# Patient Record
Sex: FEMALE | Race: WHITE
[De-identification: names, ages, dates, MRNs, and addresses within clinical notes are randomized per-mention and may not be internally consistent; named-entity substitution may affect disease eponyms.]

---

## 2019-01-25 ENCOUNTER — HOSPITAL ENCOUNTER (EMERGENCY)
Dept: HOSPITAL 62 - ER | Age: 54
Discharge: HOME | End: 2019-01-25
Payer: COMMERCIAL

## 2019-01-25 VITALS — DIASTOLIC BLOOD PRESSURE: 60 MMHG | SYSTOLIC BLOOD PRESSURE: 111 MMHG

## 2019-01-25 DIAGNOSIS — R53.82: ICD-10-CM

## 2019-01-25 DIAGNOSIS — G89.29: ICD-10-CM

## 2019-01-25 DIAGNOSIS — R29.898: ICD-10-CM

## 2019-01-25 DIAGNOSIS — R10.9: ICD-10-CM

## 2019-01-25 DIAGNOSIS — R53.81: ICD-10-CM

## 2019-01-25 DIAGNOSIS — J02.9: Primary | ICD-10-CM

## 2019-01-25 DIAGNOSIS — R06.02: ICD-10-CM

## 2019-01-25 LAB
A TYPE INFLUENZA AG: NEGATIVE
ADD MANUAL DIFF: NO
ALBUMIN SERPL-MCNC: 4.8 G/DL (ref 3.5–5)
ALP SERPL-CCNC: 79 U/L (ref 38–126)
ALT SERPL-CCNC: 27 U/L (ref 9–52)
ANION GAP SERPL CALC-SCNC: 9 MMOL/L (ref 5–19)
APPEARANCE UR: CLEAR
APTT PPP: YELLOW S
AST SERPL-CCNC: 21 U/L (ref 14–36)
B INFLUENZA AG: NEGATIVE
BASOPHILS # BLD AUTO: 0.1 10^3/UL (ref 0–0.2)
BASOPHILS NFR BLD AUTO: 1.1 % (ref 0–2)
BILIRUB DIRECT SERPL-MCNC: 0.2 MG/DL (ref 0–0.4)
BILIRUB SERPL-MCNC: 0.4 MG/DL (ref 0.2–1.3)
BILIRUB UR QL STRIP: NEGATIVE
BUN SERPL-MCNC: 14 MG/DL (ref 7–20)
CALCIUM: 9.7 MG/DL (ref 8.4–10.2)
CHLORIDE SERPL-SCNC: 101 MMOL/L (ref 98–107)
CO2 SERPL-SCNC: 29 MMOL/L (ref 22–30)
EOSINOPHIL # BLD AUTO: 0.1 10^3/UL (ref 0–0.6)
EOSINOPHIL NFR BLD AUTO: 1.9 % (ref 0–6)
ERYTHROCYTE [DISTWIDTH] IN BLOOD BY AUTOMATED COUNT: 15.5 % (ref 11.5–14)
FREE T4 (FREE THYROXINE): 1.18 NG/DL (ref 0.78–2.19)
GLUCOSE SERPL-MCNC: 93 MG/DL (ref 75–110)
GLUCOSE UR STRIP-MCNC: NEGATIVE MG/DL
HCT VFR BLD CALC: 37.9 % (ref 36–47)
HGB BLD-MCNC: 12.3 G/DL (ref 12–15.5)
KETONES UR STRIP-MCNC: NEGATIVE MG/DL
LYMPHOCYTES # BLD AUTO: 1.7 10^3/UL (ref 0.5–4.7)
LYMPHOCYTES NFR BLD AUTO: 26.3 % (ref 13–45)
MCH RBC QN AUTO: 25.8 PG (ref 27–33.4)
MCHC RBC AUTO-ENTMCNC: 32.5 G/DL (ref 32–36)
MCV RBC AUTO: 79 FL (ref 80–97)
MONOCYTES # BLD AUTO: 0.4 10^3/UL (ref 0.1–1.4)
MONOCYTES NFR BLD AUTO: 6.1 % (ref 3–13)
NEUTROPHILS # BLD AUTO: 4.3 10^3/UL (ref 1.7–8.2)
NEUTS SEG NFR BLD AUTO: 64.6 % (ref 42–78)
NITRITE UR QL STRIP: NEGATIVE
PH UR STRIP: 5 [PH] (ref 5–9)
PLATELET # BLD: 337 10^3/UL (ref 150–450)
POTASSIUM SERPL-SCNC: 4.1 MMOL/L (ref 3.6–5)
PROT SERPL-MCNC: 7.8 G/DL (ref 6.3–8.2)
PROT UR STRIP-MCNC: NEGATIVE MG/DL
RBC # BLD AUTO: 4.78 10^6/UL (ref 3.72–5.28)
SODIUM SERPL-SCNC: 138.7 MMOL/L (ref 137–145)
SP GR UR STRIP: 1.01
T3FREE SERPL-MCNC: 3.27 PG/ML (ref 2.77–5.27)
TOTAL CELLS COUNTED % (AUTO): 100 %
TSH SERPL-ACNC: 2.54 UIU/ML (ref 0.47–4.68)
UROBILINOGEN UR-MCNC: NEGATIVE MG/DL (ref ?–2)
WBC # BLD AUTO: 6.6 10^3/UL (ref 4–10.5)

## 2019-01-25 PROCEDURE — 71046 X-RAY EXAM CHEST 2 VIEWS: CPT

## 2019-01-25 PROCEDURE — 84439 ASSAY OF FREE THYROXINE: CPT

## 2019-01-25 PROCEDURE — 80053 COMPREHEN METABOLIC PANEL: CPT

## 2019-01-25 PROCEDURE — 36415 COLL VENOUS BLD VENIPUNCTURE: CPT

## 2019-01-25 PROCEDURE — 84481 FREE ASSAY (FT-3): CPT

## 2019-01-25 PROCEDURE — 81001 URINALYSIS AUTO W/SCOPE: CPT

## 2019-01-25 PROCEDURE — 85025 COMPLETE CBC W/AUTO DIFF WBC: CPT

## 2019-01-25 PROCEDURE — 87804 INFLUENZA ASSAY W/OPTIC: CPT

## 2019-01-25 PROCEDURE — 84484 ASSAY OF TROPONIN QUANT: CPT

## 2019-01-25 PROCEDURE — 99284 EMERGENCY DEPT VISIT MOD MDM: CPT

## 2019-01-25 PROCEDURE — 84443 ASSAY THYROID STIM HORMONE: CPT

## 2019-01-25 NOTE — RADIOLOGY REPORT (SQ)
EXAM DESCRIPTION:  CHEST 2 VIEWS



COMPLETED DATE/TIME:  1/25/2019 6:21 pm



REASON FOR STUDY:  SOB



COMPARISON:  10/13/2016



EXAM PARAMETERS:  NUMBER OF VIEWS: two views

TECHNIQUE: Digital Frontal and Lateral radiographic views of the chest acquired.

RADIATION DOSE: NA

LIMITATIONS: none



FINDINGS:  LUNGS AND PLEURA: No opacities, masses or pneumothorax. No pleural effusion.

MEDIASTINUM AND HILAR STRUCTURES: No masses or contour abnormalities.

HEART AND VASCULAR STRUCTURES: Heart normal size.  No evidence for failure.

BONES: No acute findings.

HARDWARE: None in the chest.

OTHER: No other significant finding.



IMPRESSION:  NO ACUTE RADIOGRAPHIC FINDING IN THE CHEST.



TECHNICAL DOCUMENTATION:  JOB ID:  [id number]

 2011 [employer]- All Rights Reserved



Reading location - IP/workstation name: [email or web address]

## 2019-01-25 NOTE — ER DOCUMENT REPORT
Entered by BRETT MCDUFFIE SCRIBE  19 1804 





Acting as scribe for:DARLENE COHEN DO





ED General





- General


Chief Complaint: Abdominal Pain


Stated Complaint: ABDOMINAL PAIN/SORE THROAT


Time Seen by Provider: 19 17:25


Notes: 





53-year-old female who presents to the emergency department today for complaints

of abdominal pain, shortness of breath, throat pain, generalized fatigue, "brain

fog" and generalized body aches for x4-5 months.  Patient states she saw a 

provider at [facility] approximately 2 months ago for the same symptoms, 

blood work was performed, but she never went back for the results.  Patient 

states that she "just wants to get checked out".


TRAVEL OUTSIDE OF THE [place] IN LAST 30 DAYS: No





- Related Data


Allergies/Adverse Reactions: 


                                        





No Known Allergies Allergy (Verified 19 14:51)


   











Past Medical History





- General


Information source: Patient





- Social History


Smoking Status: Never Smoker


Cigarette use (# per day): No


Chew tobacco use (# tins/day): No


Frequency of alcohol use: None


Drug Abuse: None


Family History: Reviewed & Not Pertinent


Patient has suicidal ideation: No


Patient has homicidal ideation: No


Renal/ Medical History: Denies: Hx Peritoneal Dialysis


Past Surgical History: Reports: Hx  Section





Review of Systems





- Review of Systems


Constitutional: See HPI, Malaise


EENT: See HPI, Throat pain


Cardiovascular: No symptoms reported


Respiratory: See HPI, Short of breath


Gastrointestinal: See HPI, Abdominal pain


Genitourinary: No symptoms reported


Female Genitourinary: No symptoms reported


Musculoskeletal: See HPI, Muscle stiffness


Skin: No symptoms reported


Hematologic/Lymphatic: No symptoms reported


Neurological/Psychological: No symptoms reported


-: Yes All other systems reviewed and negative





Physical Exam





- Vital signs


Vitals: 


                                        











Temp Pulse Resp BP Pulse Ox


 


 98.1 F   58 L  16   110/64   97 


 


 19 15:17  19 15:17  19 15:17  19 15:17  19 15:17














- Notes


Notes: 





PHYSICAL EXAM


 


GENERAL: Alert, interacts well. No acute distress.


HEAD: Normocephalic, atraumatic.


EYES: Pupils equal, round, and reactive to light. Extraocular movements intact.


ENT: Oral mucosa moist, tongue midline. 


NECK: Full range of motion. Supple. Trachea midline.


LUNGS: Clear to auscultation bilaterally, no wheezes, rales, or rhonchi. No 

respiratory distress.


HEART: Regular rate and rhythm. No murmurs, gallops, or rubs.


ABDOMEN: Soft, non-tender. Non-distended. Bowel sounds present in all 4 

quadrants. No guarding, rigidity, or rebound.


EXTREMITIES: Moves all 4 extremities spontaneously. 


NEUROLOGICAL: Alert and oriented x3. Normal speech. Cranial nerves II through 

XII grossly intact.  Finger to nose and heel to shin test intact.  Deep tendon 

reflexes 2+ in the biceps and patellar tendons.


PSYCH: Normal affect, normal mood.


SKIN: Warm, dry, normal turgor. No rashes or lesions noted.








Course





- Re-evaluation


Re-evalutation: 





19 20:05


CBC unremarkable, CMP unremarkable, cardiac enzymes negative, thyroid function 

studies normal, urinalysis unremarkable, flu swabs negative, chest x-ray shows 

no acute process.  Patient is well-appearing, no source found for her multiple 

chronic complaints.  Patient will be discharged and asked to follow-up with her 

primary care physician as an outpatient.





- Vital Signs


Vital signs: 


                                        











Temp Pulse Resp BP Pulse Ox


 


 98.1 F   58 L  16   110/64   97 


 


 19 15:17  19 15:17  19 15:17  19 15:17  19 15:17














- Laboratory


Result Diagrams: 


                                 19 18:02





                                 19 18:02


Laboratory results interpreted by me: 


                                        











  19





  18:02


 


MCV  79 L


 


MCH  25.8 L


 


RDW  15.5 H














Discharge





- Discharge


Clinical Impression: 


 Sore throat, Chronic abdominal pain





Fatigue


Qualifiers:


 Fatigue type: chronic, unspecified Qualified Code(s): R53.82 - Chronic fatigue,

unspecified





Condition: Stable


Disposition: HOME, SELF-CARE


Additional Instructions: 


Today your blood work was all normal.  I did not find an explanation for your s

ymptoms.  Your thyroid function was normal.  Your chest x-ray was normal.





It is very important that you follow-up with your primary care physician for 

further investigation into what is causing your chronic abdominal pain, 

shortness of breath, sore throat and fatigue.


Scribe Attestation: 





19 20:14


I personally performed the services described in the documentation, reviewed and

edited the documentation which was dictated to the scribe in my presence, and it

accurately records my words and actions.





I personally performed the services described in the documentation, reviewed and

edited the documentation which was dictated to the scribe in my presence, and it

accurately records my words and actions.

## 2022-11-21 ENCOUNTER — HOSPITAL ENCOUNTER (OUTPATIENT)
Dept: RADIOLOGY | Facility: HOSPITAL | Age: 57
Discharge: HOME | End: 2022-11-21
Attending: ORTHOPAEDIC SURGERY

## 2022-11-21 ENCOUNTER — OFFICE VISIT (OUTPATIENT)
Dept: OBGYN CLINIC | Facility: HOSPITAL | Age: 57
End: 2022-11-21

## 2022-11-21 VITALS
BODY MASS INDEX: 31.69 KG/M2 | HEART RATE: 71 BPM | HEIGHT: 64 IN | WEIGHT: 185.6 LBS | SYSTOLIC BLOOD PRESSURE: 102 MMHG | DIASTOLIC BLOOD PRESSURE: 70 MMHG

## 2022-11-21 DIAGNOSIS — S83.242A OTHER TEAR OF MEDIAL MENISCUS, CURRENT INJURY, LEFT KNEE, INITIAL ENCOUNTER: ICD-10-CM

## 2022-11-21 DIAGNOSIS — S83.242A ACUTE MEDIAL MENISCUS TEAR OF LEFT KNEE, INITIAL ENCOUNTER: Primary | ICD-10-CM

## 2022-11-21 DIAGNOSIS — M25.572 PAIN, JOINT, ANKLE AND FOOT, LEFT: ICD-10-CM

## 2022-11-21 DIAGNOSIS — M25.561 RIGHT KNEE PAIN, UNSPECIFIED CHRONICITY: ICD-10-CM

## 2022-11-21 DIAGNOSIS — M25.562 LEFT KNEE PAIN, UNSPECIFIED CHRONICITY: ICD-10-CM

## 2022-11-21 NOTE — ASSESSMENT & PLAN NOTE
Because of the chronicity of her imaging we are going to order a new MRI of her knee  We will see how that turns out direct her care from there

## 2022-11-21 NOTE — PROGRESS NOTES
Assessment/Plan:    No problem-specific Assessment & Plan notes found for this encounter  Diagnoses and all orders for this visit:    Acute medial meniscus tear of left knee, initial encounter  -     XR knee 3 vw left non injury; Future  -     MRI knee left  wo contrast; Future    Right knee pain, unspecified chronicity  -     XR knee 3 vw right non injury; Future    Pain, joint, ankle and foot, left  -     XR ankle 3+ vw left; Future    Other tear of medial meniscus, current injury, left knee, initial encounter          Subjective:      Patient ID: Angeline Corona is a 62 y o  female  This is a 61-year-old woman who comes in with multiple complaints including both knees and both ankles  She has been worked up over a year and a half ago was told that she had a left medial meniscal tear  She also had x-rays of her ankles which were negative  She now follows up  She did not have any treatment for her meniscal tear at the time  The following portions of the patient's history were reviewed and updated as appropriate: allergies, current medications, past family history, past medical history, past social history, past surgical history, and problem list     Review of Systems      Objective:      /70   Pulse 71   Ht 5' 4" (1 626 m)   Wt 84 2 kg (185 lb 9 6 oz)   BMI 31 86 kg/m²          Physical Exam      On physical examination she is moderately swollen in the left knee with a moderate effusion  Her alignment looks good  She has near full range of motion  She has some tenderness over the medial and lateral joint lines  Her knee is stable to exam       On her ankle she does have moderate swelling of both ankles  It appears that she probably has an effusion there as well  Her alignment looks good there is no sign of any issues

## 2023-01-12 ENCOUNTER — TELEPHONE (OUTPATIENT)
Dept: OBGYN CLINIC | Facility: HOSPITAL | Age: 58
End: 2023-01-12

## 2023-01-12 NOTE — TELEPHONE ENCOUNTER
Called left message  Patient canceled the MRI  Appointment on 1/16 with Case Jiménez to go over results  Will patient reschedule MRI or should the appt with linda be rescheduled

## 2023-02-10 ENCOUNTER — OFFICE VISIT (OUTPATIENT)
Dept: FAMILY MEDICINE CLINIC | Facility: CLINIC | Age: 58
End: 2023-02-10

## 2023-02-10 VITALS
HEART RATE: 61 BPM | DIASTOLIC BLOOD PRESSURE: 67 MMHG | RESPIRATION RATE: 18 BRPM | TEMPERATURE: 97.6 F | WEIGHT: 188 LBS | OXYGEN SATURATION: 97 % | SYSTOLIC BLOOD PRESSURE: 102 MMHG | BODY MASS INDEX: 33.31 KG/M2 | HEIGHT: 63 IN

## 2023-02-10 DIAGNOSIS — K21.9 GASTROESOPHAGEAL REFLUX DISEASE, UNSPECIFIED WHETHER ESOPHAGITIS PRESENT: ICD-10-CM

## 2023-02-10 DIAGNOSIS — Z13.6 SCREENING FOR CARDIOVASCULAR CONDITION: ICD-10-CM

## 2023-02-10 DIAGNOSIS — N95.0 POSTMENOPAUSAL VAGINAL BLEEDING: Primary | ICD-10-CM

## 2023-02-10 RX ORDER — CALCIUM CARBONATE 200(500)MG
1 TABLET,CHEWABLE ORAL DAILY
COMMUNITY

## 2023-02-10 RX ORDER — PANTOPRAZOLE SODIUM 20 MG/1
20 TABLET, DELAYED RELEASE ORAL DAILY
Qty: 60 TABLET | Refills: 0 | Status: SHIPPED | OUTPATIENT
Start: 2023-02-10

## 2023-02-10 NOTE — ASSESSMENT & PLAN NOTE
Postmenopausal for past 3-4 years  Woke up 5 days ago with vaginal bleeding  Decreased bleeding now  Described as going through her "normal" period  Mild tenderness on L lower abdomen     · FSH, LH  · CBC  · US abdominal and transvaginal   · If endometrium thicker than 4mm, refer to GYN for endometrial biopsy

## 2023-02-10 NOTE — ASSESSMENT & PLAN NOTE
Progressively worsening  reflux symptoms soon after patient eats  Led to nausea and vomiting  Not associated with specific foods  Patient stated food sometimes feels "stuck" in throat; has had events of vomiting undigested foods     · Pantoprazole 20mg, advised to take it 30 mins before meals  · F/U in 2 weeks with plan - if feeling of food stuck in throat continues with vomiting of undigested food, refer to further work up to r/o esophageal obstruction/malignancy

## 2023-02-10 NOTE — PROGRESS NOTES
Assessment/Plan:    Postmenopausal vaginal bleeding  Postmenopausal for past 3-4 years  Woke up 5 days ago with vaginal bleeding  Decreased bleeding now  Described as going through her "normal" period  Mild tenderness on L lower abdomen  [address], [address]  CBC  US abdominal and transvaginal   If endometrium thicker than 4mm, refer to GYN for endometrial biopsy    Gastroesophageal reflux disease  Progressively worsening  reflux symptoms soon after patient eats  Led to nausea and vomiting  Not associated with specific foods  Patient stated food sometimes feels "stuck" in throat; has had events of vomiting undigested foods  Pantoprazole 20mg, advised to take it 30 mins before meals  F/U in 2 weeks with plan - if feeling of food stuck in throat continues with vomiting of undigested food, refer to further work up to r/o esophageal obstruction/malignancy    Screening for cardiovascular condition  Heme work up for annual physical   Lipid panel  CMP       Diagnoses and all orders for this visit:    Postmenopausal vaginal bleeding  -     FSH and LH; Future  -     US abdomen and pelvis with transvaginal; Future    Gastroesophageal reflux disease, unspecified whether esophagitis present  -     pantoprazole (PROTONIX) 20 mg tablet; Take 1 tablet (20 mg total) by mouth daily    Screening for cardiovascular condition  -     Lipid panel; Future  -     CBC and Platelet; Future  -     Comprehensive metabolic panel; Future    Other orders  -     calcium carbonate (TUMS) 500 mg chewable tablet; Chew 1 tablet daily        Subjective:      Patient ID: Chantelle Bowers is a 62 y o  female  HPI   63 yo female with multiple complaints  Recently moved to [place] from [address] [place] [address] and lives with son  PMH of last menstrual bleeding 3-4 years ago, C section 30 years ago with b/l tubal ligation and no use of any contraceptives presents with vaginal bleeding   Patient woke up with vaginal bleeding; now at 5 days and is decreasing - states it feels like the same pattern as when she would menstruate  Currently wearing a pad  No fatigue symptoms  Patient with persistent nausea and vomiting with reflux symptoms ongoing for past few months  No association with any specific foods  Also stated that recently she has felt that food gets "stuck" in her throat which she vomits out; digested and undigested  Self treated at home with calcium carbonate which provides temporary relief  The following portions of the patient's history were reviewed and updated as appropriate: allergies, current medications, past family history, past medical history, past social history, past surgical history and problem list     Review of Systems   Constitutional: Negative for chills and fever  HENT: Negative for ear pain and sore throat  Eyes: Negative for pain and visual disturbance  Respiratory: Negative for cough and shortness of breath  Cardiovascular: Negative for chest pain and palpitations  Gastrointestinal: Positive for nausea and vomiting  Negative for abdominal pain, blood in stool, constipation and diarrhea  Genitourinary: Positive for menstrual problem, vaginal bleeding and vaginal pain  Negative for dysuria and hematuria  Musculoskeletal: Negative for arthralgias and back pain  Skin: Negative for color change and rash  Neurological: Negative for seizures and syncope  All other systems reviewed and are negative  Objective:      /67   Pulse 61   Temp 97 6 °F (36 4 °C)   Resp 18   Ht 5' 2 5" (1 588 m)   Wt 85 3 kg (188 lb)   SpO2 97%   BMI 33 84 kg/m²          Physical Exam  Vitals reviewed  Constitutional:       General: She is awake  She is not in acute distress  Appearance: Normal appearance  She is not ill-appearing  HENT:      Head: Normocephalic and atraumatic        Right Ear: External ear normal       Left Ear: External ear normal       Nose: Nose normal       Mouth/Throat:      Mouth: Mucous membranes are moist       Tongue: Tongue does not deviate from midline  Pharynx: Oropharynx is clear  Eyes:      Extraocular Movements: Extraocular movements intact  Cardiovascular:      Rate and Rhythm: Normal rate and regular rhythm  Pulses: Normal pulses  Heart sounds: Normal heart sounds  No murmur heard  Pulmonary:      Effort: Pulmonary effort is normal  No respiratory distress  Breath sounds: Normal breath sounds and air entry  No wheezing or rales  Abdominal:      General: Bowel sounds are normal  There is no distension  Palpations: Abdomen is soft  There is no mass  Tenderness: There is abdominal tenderness (L lower on mild palpation)  There is no guarding or rebound  Musculoskeletal:         General: No swelling, deformity or signs of injury  Normal range of motion  Cervical back: Normal range of motion and neck supple  Skin:     General: Skin is warm and dry  Capillary Refill: Capillary refill takes less than 2 seconds  Findings: No bruising or rash  Neurological:      General: No focal deficit present  Mental Status: She is alert and oriented to person, place, and time  Mental status is at baseline  Psychiatric:         Mood and Affect: Mood normal          Behavior: Behavior normal  Behavior is cooperative  Thought Content:  Thought content normal

## 2023-02-17 ENCOUNTER — APPOINTMENT (OUTPATIENT)
Dept: LAB | Age: 58
End: 2023-02-17

## 2023-02-17 DIAGNOSIS — Z13.6 SCREENING FOR CARDIOVASCULAR CONDITION: Primary | ICD-10-CM

## 2023-02-17 DIAGNOSIS — N95.0 POSTMENOPAUSAL VAGINAL BLEEDING: ICD-10-CM

## 2023-02-17 LAB
ALBUMIN SERPL BCP-MCNC: 3.5 G/DL (ref 3.5–5)
ALP SERPL-CCNC: 83 U/L (ref 46–116)
ALT SERPL W P-5'-P-CCNC: 18 U/L (ref 12–78)
ANION GAP SERPL CALCULATED.3IONS-SCNC: 5 MMOL/L (ref 4–13)
AST SERPL W P-5'-P-CCNC: 12 U/L (ref 5–45)
BILIRUB SERPL-MCNC: 0.35 MG/DL (ref 0.2–1)
BUN SERPL-MCNC: 14 MG/DL (ref 5–25)
CALCIUM SERPL-MCNC: 8.8 MG/DL (ref 8.3–10.1)
CHLORIDE SERPL-SCNC: 108 MMOL/L (ref 96–108)
CHOLEST SERPL-MCNC: 191 MG/DL
CO2 SERPL-SCNC: 28 MMOL/L (ref 21–32)
CREAT SERPL-MCNC: 0.76 MG/DL (ref 0.6–1.3)
ERYTHROCYTE [DISTWIDTH] IN BLOOD BY AUTOMATED COUNT: 12.9 % (ref 11.6–15.1)
FSH SERPL-ACNC: 79.2 MIU/ML
GFR SERPL CREATININE-BSD FRML MDRD: 87 ML/MIN/1.73SQ M
GLUCOSE P FAST SERPL-MCNC: 84 MG/DL (ref 65–99)
HCT VFR BLD AUTO: 41.4 % (ref 34.8–46.1)
HDLC SERPL-MCNC: 61 MG/DL
HGB BLD-MCNC: 13.1 G/DL (ref 11.5–15.4)
LDLC SERPL CALC-MCNC: 109 MG/DL (ref 0–100)
LH SERPL-ACNC: 38 MIU/ML
MCH RBC QN AUTO: 28.6 PG (ref 26.8–34.3)
MCHC RBC AUTO-ENTMCNC: 31.6 G/DL (ref 31.4–37.4)
MCV RBC AUTO: 90 FL (ref 82–98)
NONHDLC SERPL-MCNC: 130 MG/DL
PLATELET # BLD AUTO: 302 THOUSANDS/UL (ref 149–390)
PMV BLD AUTO: 11.5 FL (ref 8.9–12.7)
POTASSIUM SERPL-SCNC: 3.7 MMOL/L (ref 3.5–5.3)
PROT SERPL-MCNC: 6.9 G/DL (ref 6.4–8.4)
RBC # BLD AUTO: 4.58 MILLION/UL (ref 3.81–5.12)
SODIUM SERPL-SCNC: 141 MMOL/L (ref 135–147)
TRIGL SERPL-MCNC: 104 MG/DL
WBC # BLD AUTO: 4.72 THOUSAND/UL (ref 4.31–10.16)

## 2023-02-23 ENCOUNTER — TELEPHONE (OUTPATIENT)
Dept: FAMILY MEDICINE CLINIC | Facility: CLINIC | Age: 58
End: 2023-02-23

## 2023-02-23 NOTE — TELEPHONE ENCOUNTER
Patient was called to notify about recent blood work results and follow up on status of the [address] ordered  Patient did not  and voicemail is full

## 2023-02-28 ENCOUNTER — HOSPITAL ENCOUNTER (OUTPATIENT)
Dept: RADIOLOGY | Facility: HOSPITAL | Age: 58
Discharge: HOME | End: 2023-02-28

## 2023-02-28 DIAGNOSIS — N95.0 POSTMENOPAUSAL VAGINAL BLEEDING: ICD-10-CM

## 2023-03-07 ENCOUNTER — TELEPHONE (OUTPATIENT)
Dept: OTHER | Facility: HOSPITAL | Age: 58
End: 2023-03-07

## 2023-03-07 NOTE — TELEPHONE ENCOUNTER
Patient contacted to discuss results of US pelvis and recommend for endometrial biopsy  No answer  LVM to contact clinic

## 2023-03-07 NOTE — RESULT ENCOUNTER NOTE
Patient is post menopausal with vaginal bleeding - US shows thickened (>20mm) and heterogenous endometrium and uterine fibroid  Patient will be recommended for endometrial biopsy at our clinic for further management

## 2023-03-09 ENCOUNTER — TELEPHONE (OUTPATIENT)
Dept: FAMILY MEDICINE CLINIC | Facility: CLINIC | Age: 58
End: 2023-03-09

## 2023-03-09 NOTE — TELEPHONE ENCOUNTER
Patient is schedule for 4/5 for 60 min  As jens her appt 3/15 is only 30 mins  I keep this appt just incase Dr Bimal Viera would just see her for 30 min on 3/15

## 2023-03-09 NOTE — TELEPHONE ENCOUNTER
Hi Dr Ana Miranda,  After viewing your schedule for the currently 30 minute visit 03/15/23 for this patient, there is nothing available until April 4th to add the additional time you are requesting  Please advise

## 2023-03-16 ENCOUNTER — TELEPHONE (OUTPATIENT)
Dept: FAMILY MEDICINE CLINIC | Facility: CLINIC | Age: 58
End: 2023-03-16

## 2023-03-16 NOTE — TELEPHONE ENCOUNTER
Called patient x2 at mobile number - call did not go through  Called alternate contact - Flavio Stokes - did not answer; LVM for patient to contact clinic to discuss plan for upcoming appointments

## 2023-03-22 ENCOUNTER — TELEPHONE (OUTPATIENT)
Dept: FAMILY MEDICINE CLINIC | Facility: CLINIC | Age: 58
End: 2023-03-22

## 2023-03-23 ENCOUNTER — OFFICE VISIT (OUTPATIENT)
Dept: FAMILY MEDICINE CLINIC | Facility: CLINIC | Age: 58
End: 2023-03-23

## 2023-03-23 VITALS
SYSTOLIC BLOOD PRESSURE: 100 MMHG | RESPIRATION RATE: 18 BRPM | DIASTOLIC BLOOD PRESSURE: 62 MMHG | BODY MASS INDEX: 33.52 KG/M2 | TEMPERATURE: 97.9 F | HEIGHT: 63 IN | OXYGEN SATURATION: 96 % | HEART RATE: 76 BPM | WEIGHT: 189.2 LBS

## 2023-03-23 DIAGNOSIS — N95.0 POSTMENOPAUSAL VAGINAL BLEEDING: Primary | ICD-10-CM

## 2023-03-23 DIAGNOSIS — K21.9 GASTROESOPHAGEAL REFLUX DISEASE, UNSPECIFIED WHETHER ESOPHAGITIS PRESENT: ICD-10-CM

## 2023-03-23 NOTE — PROGRESS NOTES
Name: Julia Downs      : 1965      MRN: [id number]  Encounter Provider: Deepak Owens DO  Encounter Date: 3/23/2023   Encounter department: [facility] [address] [place] [address]    Assessment & Plan     1  Postmenopausal vaginal bleeding  Assessment & Plan:  - pt with new-onset bleeding that lasted 1 week last month - describes it similar to a period; reports of similar event years ago  - has a aunt with hx of breast cancer but denies any family hx of ovarian and uterine cancer  - reports a previous procedure for  when she was in her 25s and a prior  but no other GYN surgeries   - TVUS  showing thickened and heterogeneous endometrium with 20 mm thickness  Presence of uterine fibroid   - FSH and LH WNL    Plan:  - refer to GYN for endometrial biopsy   - recommend continue to monitor for further episodes    Orders:  -     Ambulatory Referral to Gynecology; Future    2   Gastroesophageal reflux disease, unspecified whether esophagitis present  Assessment & Plan:  - pt was prescribed pantoprazole 20 mg at last visit but has not been able to pick it up due to insurance issues  -Reports that her food sometimes gets "stuck" in her throat and she has had episodes of vomiting undigested food - concern for diverticulum   -She was worked up for this by her PCP back in [place] and was in the process of getting an EGD when she moved to [address] [place] [address] Road:  -Recommend OTC omeprazole 20 Mg daily, take 30 minutes before meals  -Patient may use Tums as needed  -Reviewed reflux precautions such as limiting spicy/citrus foods, staying upright for 30 minutes after eating, or pillows to keep neck upright when sleeping  -If no/minimal improvement with medication, consider barium swallow and/or referral to GI for work-up/EGD for complaints of vomiting of undigested food  -f/u 4-6 with PCP to review benefits           Subjective      61 y/o F presents for follow up eval for postmenopausal vaginal bleeding and to review results  She reports a weeklong episode back in February that tapered off similar to her period  She has not had another episode and currently is not bleeding  Reports a history of procedure for an  when she was in her 25s and a prior  but denies any other gynecological surgical history  Reports that she has had normal Paps in the past     Review of Systems   Genitourinary: Positive for vaginal bleeding (an episode in 2023, no bleeding currently)  Negative for difficulty urinating, dysuria, hematuria, urgency, vaginal discharge and vaginal pain  Current Outpatient Medications on File Prior to Visit   Medication Sig   • calcium carbonate (TUMS) 500 mg chewable tablet Chew 1 tablet daily   • pantoprazole (PROTONIX) 20 mg tablet Take 1 tablet (20 mg total) by mouth daily (Patient not taking: Reported on 3/23/2023)       Objective     /62 (BP Location: Left arm, Patient Position: Sitting, Cuff Size: Standard)   Pulse 76   Temp 97 9 °F (36 6 °C) (Temporal)   Resp 18   Ht 5' 2 5" (1 588 m)   Wt 85 8 kg (189 lb 3 2 oz)   SpO2 96%   BMI 34 05 kg/m²     Physical Exam  Vitals reviewed  Constitutional:       General: She is not in acute distress  Appearance: Normal appearance  HENT:      Head: Normocephalic and atraumatic  Right Ear: External ear normal       Left Ear: External ear normal       Nose: Nose normal       Mouth/Throat:      Pharynx: Oropharynx is clear  Eyes:      Conjunctiva/sclera: Conjunctivae normal    Cardiovascular:      Rate and Rhythm: Normal rate  Pulses: Normal pulses  Pulmonary:      Effort: Pulmonary effort is normal    Abdominal:      Palpations: Abdomen is soft  Skin:     General: Skin is warm  Capillary Refill: Capillary refill takes less than 2 seconds  Neurological:      Mental Status: She is alert and oriented to person, place, and time        Gait: Gait normal    Psychiatric:         Mood and Affect: Mood normal          Behavior: Behavior normal          Thought Content:  Thought content normal          Judgment: Judgment normal        Amanda Pandya, DO

## 2023-03-23 NOTE — ASSESSMENT & PLAN NOTE
- pt with new-onset bleeding that lasted 1 week last month - describes it similar to a period; reports of similar event years ago  - has a aunt with hx of breast cancer but denies any family hx of ovarian and uterine cancer  - reports a previous procedure for  when she was in her 25s and a prior  but no other GYN surgeries   - TVUS  showing thickened and heterogeneous endometrium with 20 mm thickness    Presence of uterine fibroid   - FSH and LH WNL    Plan:  - refer to GYN for endometrial biopsy   - recommend continue to monitor for further episodes

## 2023-03-23 NOTE — ASSESSMENT & PLAN NOTE
- pt was prescribed pantoprazole 20 mg at last visit but has not been able to pick it up due to insurance issues  -Reports that her food sometimes gets "stuck" in her throat and she has had episodes of vomiting undigested food - concern for diverticulum   -She was worked up for this by her PCP back in [place] and was in the process of getting an EGD when she moved to [address] [place] [address]:  -Recommend OTC omeprazole 20 Mg daily, take 30 minutes before meals  -Patient may use Tums as needed  -Reviewed reflux precautions such as limiting spicy/citrus foods, staying upright for 30 minutes after eating, or pillows to keep neck upright when sleeping  -If no/minimal improvement with medication, consider barium swallow and/or referral to GI for work-up/EGD for complaints of vomiting of undigested food  -f/u 4-6 with PCP to review benefits

## 2023-04-05 ENCOUNTER — TELEPHONE (OUTPATIENT)
Dept: FAMILY MEDICINE CLINIC | Facility: CLINIC | Age: 58
End: 2023-04-05

## 2023-04-05 ENCOUNTER — OFFICE VISIT (OUTPATIENT)
Dept: FAMILY MEDICINE CLINIC | Facility: CLINIC | Age: 58
End: 2023-04-05

## 2023-04-05 VITALS
SYSTOLIC BLOOD PRESSURE: 92 MMHG | HEIGHT: 63 IN | TEMPERATURE: 97.8 F | WEIGHT: 188 LBS | BODY MASS INDEX: 33.31 KG/M2 | DIASTOLIC BLOOD PRESSURE: 65 MMHG | HEART RATE: 76 BPM

## 2023-04-05 DIAGNOSIS — N95.0 POSTMENOPAUSAL VAGINAL BLEEDING: Primary | ICD-10-CM

## 2023-04-05 PROBLEM — N84.1 POLYP AT CERVICAL OS: Status: ACTIVE | Noted: 2023-04-05

## 2023-04-05 RX ORDER — IBUPROFEN 600 MG/1
600 TABLET ORAL ONCE
Status: COMPLETED | OUTPATIENT
Start: 2023-04-05 | End: 2023-04-05

## 2023-04-05 RX ADMIN — IBUPROFEN 600 MG: 600 TABLET ORAL at 15:32

## 2023-04-05 NOTE — PROGRESS NOTES
Endometrial biopsy    Date/Time: 4/5/2023 3:56 PM  Performed by: Noemy Hodgson MD  Authorized by: Noemy Hodgson MD   Universal Protocol:  Procedure performed by:  Consent: Verbal consent obtained  Written consent obtained  Risks and benefits: risks, benefits and alternatives were discussed  Consent given by: patient  Patient understanding: patient states understanding of the procedure being performed  Patient consent: the patient's understanding of the procedure matches consent given  Procedure consent: procedure consent matches procedure scheduled  Relevant documents: relevant documents present and verified  Radiology Images displayed and confirmed  If images not available, report reviewed: imaging studies available  Patient identity confirmed: verbally with patient      Indication:     Indications: Post-menopausal bleeding      Chronicity of post-menopausal bleeding:  New    Progression of post-menopausal bleeding:  Resolved  Pre-procedure:     Premeds:  Ibuprofen  Procedure:     Procedure: endometrial biopsy with Pipelle      A bivalve speculum was placed in the vagina: yes      Cervix cleaned and prepped: yes      The cervix was dilated: no      Uterus sounded: yes      Uterus sound depth (cm):  9    Specimen collected: specimen collected and sent to pathology      Patient tolerated procedure well with no complications: yes    Findings:     Uterus size:  Non-gravid    Cervix: normal      Adnexa: normal    Comments:     Procedure comments: On speculum exam, 1 5 cm dark red polyp protruding from cervical os, held with ringed forceps and polypectomy was performed  Specimen was sent to pathology  Pipel was then introduced and endometrial biopsy was collected  Patient tolerated exam very well     Procedure performed with Dr Godwin Seaman MD

## 2023-04-05 NOTE — TELEPHONE ENCOUNTER
Consent signed by patient and Dr Edson Kerns by the time of patient appointment  Form placed in the [facility] in the [address] [place] [address] for scan   Thanks

## 2023-04-06 NOTE — PROGRESS NOTES
Name: Merly Godfrey      : 1965      MRN: [id number]  Encounter Provider: Jaden Nunes MD  Encounter Date: 2023   Encounter department: [address] [place] [address]  Postmenopausal vaginal bleeding  Assessment & Plan:  Patient with post-menopausal bleeding presented for endometrial biopsy  Risks and benefits reviewed  Patient consented  No bleeding since initial encounter on 2/10/23  TVUS 23: 20 mm endometrial stripe  -Polyp on cervical os; polypectomy performed = tissue sent for pathology  -Endometrial biopsy = tissue sent for pathology   -Pap smear performed = tissue sent for pathology  F/U with patient once pathology results  Consider Gyn/Onc referral depending on results  Ibuprofen prn   Recommended to contact clinic/go to ED if significant bleeding, cramping, or pain post procedure  Orders:  -     ibuprofen (MOTRIN) tablet 600 mg  -     Tissue Exam; Future  -     Tissue Exam; Future  -     Liquid-based pap, screening  -     Endometrial biopsy  -     Tissue Exam  -     Tissue Exam; Future  -     Tissue Exam           Subjective      HPI 61 yo female presents for endometrial biopsy after complaints of post menopausal bleeding  TVUS with 20mm  No recurrent bleeding since initial encounter on 2/10/23  Review of Systems   Constitutional: Negative for chills and fever  HENT: Negative for ear pain and sore throat  Eyes: Negative for pain and visual disturbance  Respiratory: Negative for cough and shortness of breath  Cardiovascular: Negative for chest pain and palpitations  Gastrointestinal: Negative for abdominal pain and vomiting  Genitourinary: Negative for dysuria and hematuria  Musculoskeletal: Negative for arthralgias and back pain  Skin: Negative for color change and rash  Neurological: Negative for seizures and syncope  All other systems reviewed and are negative        Current Outpatient Medications on File "Prior to Visit   Medication Sig   • calcium carbonate (TUMS) 500 mg chewable tablet Chew 1 tablet daily (Patient not taking: Reported on 4/5/2023)   • pantoprazole (PROTONIX) 20 mg tablet Take 1 tablet (20 mg total) by mouth daily (Patient not taking: Reported on 3/23/2023)       Objective     BP 92/65 (BP Location: Left arm, Patient Position: Sitting, Cuff Size: Large)   Pulse 76   Temp 97 8 °F (36 6 °C) (Temporal)   Ht 5' 2 5\" (1 588 m)   Wt 85 3 kg (188 lb)   BMI 33 84 kg/m²     Physical Exam  Vitals reviewed  Exam conducted with a chaperone present  Constitutional:       General: She is awake  She is not in acute distress  Appearance: Normal appearance  She is not ill-appearing  HENT:      Head: Normocephalic and atraumatic  Right Ear: External ear normal       Left Ear: External ear normal       Nose: Nose normal       Mouth/Throat:      Mouth: Mucous membranes are moist       Tongue: Tongue does not deviate from midline  Pharynx: Oropharynx is clear  Eyes:      Extraocular Movements: Extraocular movements intact  Cardiovascular:      Rate and Rhythm: Normal rate and regular rhythm  Pulses: Normal pulses  Heart sounds: Normal heart sounds  No murmur heard  Pulmonary:      Effort: Pulmonary effort is normal  No respiratory distress  Breath sounds: Normal breath sounds and air entry  No wheezing or rales  Abdominal:      General: Bowel sounds are normal  There is no distension  Palpations: Abdomen is soft  There is no mass  Tenderness: There is no abdominal tenderness  There is no guarding or rebound  Genitourinary:     General: Normal vulva  Exam position: Lithotomy position  Comments: 1 5cm bloody polyp found on cervical os on speculum exam  Removed via polypectomy  Musculoskeletal:         General: No swelling, deformity or signs of injury  Normal range of motion  Cervical back: Normal range of motion and neck supple     Skin:     General: " Skin is warm and dry  Capillary Refill: Capillary refill takes less than 2 seconds  Findings: No bruising or rash  Neurological:      General: No focal deficit present  Mental Status: She is alert and oriented to person, place, and time  Mental status is at baseline  Psychiatric:         Mood and Affect: Mood normal          Behavior: Behavior normal  Behavior is cooperative  Thought Content:  Thought content normal        Selene Gilbert MD

## 2023-04-07 ENCOUNTER — TELEPHONE (OUTPATIENT)
Dept: FAMILY MEDICINE CLINIC | Facility: CLINIC | Age: 58
End: 2023-04-07

## 2023-04-11 PROBLEM — Z13.6 SCREENING FOR CARDIOVASCULAR CONDITION: Status: RESOLVED | Noted: 2023-02-10 | Resolved: 2023-04-11

## 2023-04-19 PROBLEM — R13.19 INTERMITTENT DYSPHAGIA: Status: ACTIVE | Noted: 2023-04-19

## 2023-04-19 PROBLEM — R11.10 REGURGITATION OF FOOD: Status: ACTIVE | Noted: 2023-04-19

## 2023-04-19 PROBLEM — Z12.11 SCREENING FOR COLON CANCER: Status: ACTIVE | Noted: 2023-04-19

## 2023-04-20 DIAGNOSIS — Z12.11 SCREEN FOR COLON CANCER: Primary | ICD-10-CM

## 2023-04-24 ENCOUNTER — TELEPHONE (OUTPATIENT)
Dept: GASTROENTEROLOGY | Facility: AMBULARY SURGERY CENTER | Age: 58
End: 2023-04-24

## 2023-04-24 NOTE — TELEPHONE ENCOUNTER
LM with patient reminding of upcoming colonoscopy for 5/2/23  Also advised patient that prep instructions were previously emailed and prep has been sent to pharmacy       [facility]

## 2023-04-25 NOTE — TELEPHONE ENCOUNTER
Pt called in requesting to resend prep instructions  Pt stated that she had incorrect email on file  Updated the email

## 2023-05-01 ENCOUNTER — TELEPHONE (OUTPATIENT)
Dept: FAMILY MEDICINE CLINIC | Facility: CLINIC | Age: 58
End: 2023-05-01

## 2023-05-01 NOTE — TELEPHONE ENCOUNTER
Received records request from [id number], sent to [facility]      Scanned into chart

## 2023-05-03 ENCOUNTER — HOSPITAL ENCOUNTER (OUTPATIENT)
Dept: RADIOLOGY | Facility: HOSPITAL | Age: 58
Discharge: HOME | End: 2023-05-03

## 2023-05-03 DIAGNOSIS — N95.0 POSTMENOPAUSAL VAGINAL BLEEDING: ICD-10-CM

## 2023-05-10 DIAGNOSIS — N95.0 POSTMENOPAUSAL VAGINAL BLEEDING: Primary | ICD-10-CM

## 2023-05-11 ENCOUNTER — TELEPHONE (OUTPATIENT)
Dept: FAMILY MEDICINE CLINIC | Facility: CLINIC | Age: 58
End: 2023-05-11

## 2023-05-11 NOTE — RESULT ENCOUNTER NOTE
Repeat TVUS shows continued, albeit lessened endometrial thickening (>4mm)  Will refer to Gyn for further recommendations and management

## 2023-05-11 NOTE — TELEPHONE ENCOUNTER
· Patient contacted x 3 (patient, son, and sister listed as alternative contacts) with no answers to inform patient about TVUS results and Gyn referral    · Patient scheduled for appointment at clinic on 5/17/23 - will inform patient of results and plan at that time

## 2023-05-17 ENCOUNTER — OFFICE VISIT (OUTPATIENT)
Dept: FAMILY MEDICINE CLINIC | Facility: CLINIC | Age: 58
End: 2023-05-17

## 2023-05-17 VITALS
WEIGHT: 192 LBS | DIASTOLIC BLOOD PRESSURE: 64 MMHG | OXYGEN SATURATION: 98 % | BODY MASS INDEX: 34.02 KG/M2 | RESPIRATION RATE: 14 BRPM | TEMPERATURE: 97.9 F | HEIGHT: 63 IN | HEART RATE: 70 BPM | SYSTOLIC BLOOD PRESSURE: 110 MMHG

## 2023-05-17 DIAGNOSIS — N95.0 POSTMENOPAUSAL VAGINAL BLEEDING: Primary | ICD-10-CM

## 2023-05-17 DIAGNOSIS — R13.19 INTERMITTENT DYSPHAGIA: ICD-10-CM

## 2023-05-18 NOTE — ASSESSMENT & PLAN NOTE
"- Continued intermittent \"sticking\" feeling when swallowing  - Barium swallow ordered (not yet scheduled)  - GI referral given previously to determine if need for upper endoscopy  · F/U with GI   · F/U barium swallow  "

## 2023-05-18 NOTE — PROGRESS NOTES
"Name: Wilson Saunders      : 1965      MRN: [id number]  Encounter Provider: Shaggy Mcclellan MD  Encounter Date: 2023   Encounter department: [address] [place] [address]  Postmenopausal vaginal bleeding  Assessment & Plan:  - Post-polypectomy TVUS shows continued endometrial thickening (20mm -> 10mm)  - Denies additional episodes of vaginal bleeding  Referral to Gyn-Onc     Orders:  -     Ambulatory Referral to Gynecologic Oncology; Future    2  Intermittent dysphagia  Assessment & Plan:  - Continued intermittent \"sticking\" feeling when swallowing  - Barium swallow ordered (not yet scheduled)  - GI referral given previously to determine if need for upper endoscopy  F/U with GI   F/U barium swallow             Subjective      HPI 61 yo presents for follow up with for TVUS results on 5/3/23 s/p polypectomy performed for post-menopausal vaginal bleeding  TVUS results showed continued endometrial thickening at 10-11 mm, although decreased from 20 mm pre-procedure  Denies additional episodes of vaginal bleeding, fever, chest pain, sob, vaginal discharge  Patient also continues w intermittent sticking sensation when swallowing  Patient was ordered barium swallow testing and referral to GI placed for consideration of upper endoscopy  Review of Systems   Constitutional: Negative for chills and fever  HENT: Negative for ear pain and sore throat  Eyes: Negative for pain and visual disturbance  Respiratory: Negative for cough and shortness of breath  Cardiovascular: Negative for chest pain and palpitations  Gastrointestinal: Negative for abdominal pain and vomiting  Dysphagia   Genitourinary: Negative for dysuria and hematuria  Musculoskeletal: Negative for arthralgias and back pain  Skin: Negative for color change and rash  Neurological: Negative for seizures and syncope     All other systems reviewed and are " "negative  Current Outpatient Medications on File Prior to Visit   Medication Sig   • calcium carbonate (TUMS) 500 mg chewable tablet Chew 1 tablet daily   • pantoprazole (PROTONIX) 20 mg tablet Take 1 tablet (20 mg total) by mouth daily   • polyethylene glycol (GOLYTELY) 4000 mL solution Take 4,000 mL by mouth once for 1 dose Please follow instructions as provided by office       Objective     /64 (BP Location: Left arm, Patient Position: Sitting, Cuff Size: Large)   Pulse 70   Temp 97 9 °F (36 6 °C) (Temporal)   Resp 14   Ht 5' 2 5\" (1 588 m)   Wt 87 1 kg (192 lb)   SpO2 98%   BMI 34 56 kg/m²     Physical Exam  Vitals reviewed  Constitutional:       General: She is awake  She is not in acute distress  Appearance: Normal appearance  She is not ill-appearing  HENT:      Head: Normocephalic and atraumatic  Right Ear: External ear normal       Left Ear: External ear normal       Nose: Nose normal       Mouth/Throat:      Mouth: Mucous membranes are moist       Tongue: Tongue does not deviate from midline  Pharynx: Oropharynx is clear  Eyes:      Extraocular Movements: Extraocular movements intact  Cardiovascular:      Rate and Rhythm: Normal rate and regular rhythm  Pulses: Normal pulses  Heart sounds: Normal heart sounds  No murmur heard  Pulmonary:      Effort: Pulmonary effort is normal  No respiratory distress  Breath sounds: Normal breath sounds and air entry  No wheezing or rales  Abdominal:      General: Bowel sounds are normal  There is no distension  Palpations: Abdomen is soft  There is no mass  Tenderness: There is no abdominal tenderness  There is no guarding or rebound  Musculoskeletal:         General: No swelling, deformity or signs of injury  Normal range of motion  Cervical back: Normal range of motion and neck supple  Skin:     General: Skin is warm and dry  Capillary Refill: Capillary refill takes less than 2 seconds   " Findings: No bruising or rash  Neurological:      General: No focal deficit present  Mental Status: She is alert and oriented to person, place, and time  Mental status is at baseline  Psychiatric:         Mood and Affect: Mood normal          Behavior: Behavior normal  Behavior is cooperative  Thought Content:  Thought content normal        Fernanda Abraham MD

## 2023-05-18 NOTE — ASSESSMENT & PLAN NOTE
- Post-polypectomy TVUS shows continued endometrial thickening (20mm -> 10mm)  - Denies additional episodes of vaginal bleeding  · Referral to Gyn-Onc

## 2023-05-19 ENCOUNTER — TELEPHONE (OUTPATIENT)
Dept: HEMATOLOGY ONCOLOGY | Facility: CLINIC | Age: 58
End: 2023-05-19

## 2023-05-19 NOTE — TELEPHONE ENCOUNTER
Appointment Schedule   Who are you speaking with? Patient   If it is not the patient, are they listed on an active communication consent form? N/A   Which provider is the appointment scheduled with? Dr Elenita Sandhu   At which location is the appointment scheduled for? [place]   When is the appointment scheduled? Please list date and time 5/30/23 1115am   What is the reason for this appointment? Consult    Did patient voice understanding of the details of this appointment? Yes   Was the no show policy reviewed with patient?  Yes

## 2023-05-22 NOTE — PROGRESS NOTES
Assessment/Plan:    Problem List Items Addressed This Visit        Other    Postmenopausal vaginal bleeding - Primary     60yo with PMB presents for consultation  We discussed the etiology of post menopausal bleeding including vaginal atrophy, polyps, fibroids, hyperplasia and/or overt malignancy  Most concerning would be hyperplasia or carcinoma of which treatment is most commonly surgical with hysterectomy  She has had an EMB which was benign and strophic however her EMS remains thickened  We discussed continued observation with repeat US vs definitive work up with D+C  Surgical risks were reviewed with the patient including infection, blood loss, transfusion, damage to other organs, uterine perforation and possible need for additional procedures  Postoperative recovery was reviewed  The patient was given time to ask pertinent questions and would like to proceed with the procedure  Other alternatives including nonsurgical options reviewed with patients  Questions answered  Consents signed              Relevant Orders    Case request operating room: DILATATION AND CURETTAGE (D&C) WITH HYSTEROSCOPY (Completed)           CHIEF COMPLAINT: vaginal bleeding    Oncology Problem:   Cancer Staging   No matching staging information was found for the patient  Previous therapy:  Oncology History    No history exists  Most recent imaging:  US pelvis complete w transvaginal  Narrative: PELVIC ULTRASOUND, COMPLETE    INDICATION:  N95 0: Postmenopausal bleeding  The patient is 62years old  COMPARISON: Pelvic ultrasound 2/28/2023    TECHNIQUE:   Transabdominal pelvic ultrasound was performed in sagittal and transverse planes with a curvilinear transducer  Additional transvaginal imaging was performed to better evaluate the endometrium and ovaries  Imaging included volumetric   sweeps as well as traditional still imaging technique      FINDINGS:    UTERUS:  The uterus is anteverted in position, measuring 9 5 x 5 0 x 6 9 cm  Heterogeneous myometrium  2 5 x 2 3 x 2 8 cm lower uterine segment fibroid, previously measured 3 5 x 2 8 x 2 3 cm  The cervix appears within normal limits  ENDOMETRIUM:  The endometrial echo complex has an combined thickness of approximately 10-11 mm  Previously this measured about 20 mm  Tiny amount of endometrial fluid suggested  No focal pathology apparent  OVARIES/ADNEXA:  Right ovary:  2 2 x 2 3 x 1 0 cm  2 6 mL  Left ovary:  2 2 x 1 4 x 1 1 cm  1 9 mL  Ovarian Doppler flow is within normal limits  No suspicious ovarian or adnexal abnormality  OTHER:  No free fluid or loculated fluid collections  Impression: Residual endometrial thickening albeit decreased from prior study  Tiny amount of endometrial fluid but no discrete pathology  Continued ultrasound surveillance would be prudent  Heterogeneous uterus with small fibroid again noted  Workstation performed: [id number]        Patient ID: Guerita Jefferson is a 62 y o  female  60yo with PMB presents for consultation  Pt was seen by PCP c/o PMB for a week in February  Ultrasound was obtained showing EMS 20mm  EMB obtained and noted to have cervical polyp which was removed  All pathology was negative  Pelvic US repeated for unclear reason showing EMS of 10mm  Menopause about 2 years ago  Reports maybe one other time of bleeding  She has not had any further bleeding since polyp removed  Denies pelvic pain/discomfort  She is anxious regarding possible diagnosis  Review of Systems   Constitutional: Negative for appetite change, chills, fatigue and fever  Respiratory: Negative for chest tightness and shortness of breath  Gastrointestinal: Negative for abdominal distention, abdominal pain, constipation, diarrhea and nausea  Genitourinary: Negative for difficulty urinating, flank pain, frequency, urgency, vaginal bleeding, vaginal discharge and vaginal pain     Musculoskeletal: Negative for back pain, joint "swelling and myalgias  Skin: Negative for rash  Neurological: Negative for dizziness, light-headedness, numbness and headaches  Psychiatric/Behavioral: The patient is nervous/anxious  Current Outpatient Medications   Medication Sig Dispense Refill   • calcium carbonate (TUMS) 500 mg chewable tablet Chew 1 tablet daily     • ibuprofen (MOTRIN) 200 mg tablet Take 200 mg by mouth as needed for mild pain     • pantoprazole (PROTONIX) 20 mg tablet Take 1 tablet (20 mg total) by mouth daily 60 tablet 0   • bisacodyl (DULCOLAX) 5 mg EC tablet Take 4 tablets (20 mg total) by mouth once for 1 dose 4 tablet 0   • polyethylene glycol (GOLYTELY) 4000 mL solution Take 4,000 mL by mouth once for 1 dose Please follow instructions as provided by office 4000 mL 0   • polyethylene glycol (GOLYTELY) 4000 mL solution Take 4,000 mL by mouth once for 1 dose 4000 mL 0     No current facility-administered medications for this visit  No Known Allergies    No past medical history on file  No past surgical history on file  OB History    No obstetric history on file  No family history on file  The following portions of the patient's history were reviewed and updated as appropriate: allergies, current medications, past family history, past medical history, past social history, past surgical history and problem list       Objective:    Blood pressure 108/60, pulse 88, temperature (!) 97 3 °F (36 3 °C), temperature source Temporal, height 5' 2 5\" (1 588 m), weight 87 1 kg (192 lb), SpO2 98 %  Body mass index is 34 56 kg/m²  Physical Exam  HENT:      Head: Normocephalic and atraumatic  Nose: Nose normal    Cardiovascular:      Rate and Rhythm: Normal rate and regular rhythm  Pulmonary:      Effort: Pulmonary effort is normal    Abdominal:      General: There is no distension  Palpations: Abdomen is soft  There is no mass     Genitourinary:     Comments: defer  Musculoskeletal:         General: No " "swelling  Normal range of motion  Cervical back: Normal range of motion  Skin:     General: Skin is warm and dry  Neurological:      General: No focal deficit present  Mental Status: She is alert     Psychiatric:         Mood and Affect: Mood normal            No results found for: \"\"  Lab Results   Component Value Date    HCT 41 4 02/17/2023    HGB 13 1 02/17/2023    MCV 90 02/17/2023     02/17/2023    WBC 4 72 02/17/2023     Lab Results   Component Value Date    ALKPHOS 83 02/17/2023    ALT 18 02/17/2023    AST 12 02/17/2023    BUN 14 02/17/2023    CALCIUM 8 8 02/17/2023     02/17/2023    CO2 28 02/17/2023    CREATININE 0 76 02/17/2023    EGFR 87 02/17/2023    GLUF 84 02/17/2023    K 3 7 02/17/2023        Trend:  No results found for: \"\"  "

## 2023-05-23 ENCOUNTER — CONSULT (OUTPATIENT)
Dept: GASTROENTEROLOGY | Facility: CLINIC | Age: 58
End: 2023-05-23

## 2023-05-23 VITALS
DIASTOLIC BLOOD PRESSURE: 78 MMHG | BODY MASS INDEX: 34.12 KG/M2 | TEMPERATURE: 97.5 F | WEIGHT: 192.6 LBS | SYSTOLIC BLOOD PRESSURE: 112 MMHG | HEIGHT: 63 IN

## 2023-05-23 DIAGNOSIS — Z12.11 SCREEN FOR COLON CANCER: Primary | ICD-10-CM

## 2023-05-23 DIAGNOSIS — R13.19 INTERMITTENT DYSPHAGIA: ICD-10-CM

## 2023-05-23 RX ORDER — BISACODYL 5 MG/1
20 TABLET, DELAYED RELEASE ORAL ONCE
Qty: 4 TABLET | Refills: 0 | Status: SHIPPED | OUTPATIENT
Start: 2023-05-23 | End: 2023-05-23

## 2023-05-23 NOTE — PATIENT INSTRUCTIONS
Scheduled date of EGD/colonoscopy (as of today):06/14/2023  Physician performing EGD/colonoscopy: Darlyn Fletcher   Location of EGD/colonoscopy:[place]  Desired bowel prep reviewed with patient:sanjay babin  Instructions reviewed with patient by:Olga  Clearances:   n/a

## 2023-05-23 NOTE — H&P (VIEW-ONLY)
[facility] - Outpatient Consultation  Loco Duran 62 y o  female MRN: [id number]  Encounter: [id number]          ASSESSMENT AND PLAN:      1  Intermittent dysphagia  Patient presenting currently with dysphagia mainly to solids ongoing for about a year and a half  Dysphagia happens intermittently  Regurgitation happens intermittently  Chest pain is denied  Weight loss is denied  Eckardt score 2  She describes like his food may get stuck in her neck area  Her symptoms are more concerning for oropharyngeal dysphagia  She was seen by her PCP, note was reviewed, esophagram was ordered, has not been performed yet  We will perform endoscopy, if endoscopy is otherwise normal, will order video barium swallow  Follow-up in 3 months      2  Screen for colon cancer  - Patient is 62 y o , has not been screened for colon cancer in the past, denies any family history of GI malignancy or IBD, no alarm symptoms at this point, currently having normal bowel movements  - Currently average risk for colonoscopy, other options for colorectal cancer screening were discussed with the patient, will perform colonoscopy, risk and benefits of the procedure were discussed with the patient including but not limited to bleeding, infection, perforation and missing an adenoma   ______________________________________________________________________    HPI:  Patient seen and examined, he is a 51-year-old female patient with history of ongoing dysphagia for about a year and a half, otherwise she denies any recent events, currently is tolerating PO route with intermittent dysphagia, denies nausea or vomiting, is passing flatus and having bowel movements, describes stool as [place] stool chart #3-5, denies abdominal pain, no recent weight loss      REVIEW OF SYSTEMS:    CONSTITUTIONAL: Denies any fever, chills, or weight loss  HEENT: No earache or visual disturbances  CARDIOVASCULAR: No chest pain or palpitations  "  RESPIRATORY: Denies shortness of breath or dyspnea on exertion  GASTROINTESTINAL: As noted in the History of Present Illness  GENITOURINARY: No problems with urination  NEUROLOGIC: No dizziness or headaches  MUSCULOSKELETAL: No muscle or joint pain  SKIN: No skin rashes or itching  ENDOCRINE: No intolerance to heat or cold  Historical Information   History reviewed  No pertinent past medical history  History reviewed  No pertinent surgical history  Social History   Social History     Substance and Sexual Activity   Alcohol Use Never     Social History     Substance and Sexual Activity   Drug Use Never     Social History     Tobacco Use   Smoking Status Never   Smokeless Tobacco Never     History reviewed  No pertinent family history  Meds/Allergies       Current Outpatient Medications:   •  bisacodyl (DULCOLAX) 5 mg EC tablet  •  calcium carbonate (TUMS) 500 mg chewable tablet  •  pantoprazole (PROTONIX) 20 mg tablet  •  polyethylene glycol (GOLYTELY) 4000 mL solution  •  polyethylene glycol (GOLYTELY) 4000 mL solution    No Known Allergies        Objective     Blood pressure 112/78, temperature 97 5 °F (36 4 °C), temperature source Tympanic, height 5' 2 5\" (1 588 m), weight 87 4 kg (192 lb 9 6 oz)  Body mass index is 34 67 kg/m²  PHYSICAL EXAM:      General Appearance:   Alert, cooperative, no distress   HEENT:   Normocephalic, atraumatic, anicteric  Neck:  Supple, symmetrical, trachea midline   Lungs:   Clear to auscultation bilaterally; no rales, rhonchi or wheezing; respirations unlabored    Heart[de-identified]   Regular rate and rhythm     Abdomen:   Soft, non-tender, non-distended; normal bowel sounds; no masses, no organomegaly    Genitalia:   Deferred    Rectal:   Deferred    Extremities:  No cyanosis or edema                  Lymph nodes: No palpable cervical lymphadenopathy   Skin:  No jaundice, rashes, or lesions              Lab Results:   No visits with results within 1 Day(s) from " this visit  Latest known visit with results is:   Office Visit on 04/05/2023   Component Date Value   • Case Report 04/05/2023                      Value:Gynecologic Cytology Report                       Case: [id number]                                  Authorizing Provider:  Laly Helton MD     Collected:           04/05/2023 1615              Ordering Location:     [place] [address]      Received:            04/05/2023 1616                                     [facility]                                                    First Screen:          Shawna Gross, CT                                                       Specimen:    LIQUID-BASED PAP, SCREENING, Cervix                                                       • Primary Interpretation 04/05/2023 Negative for intraepithelial lesion or malignancy    • Specimen Adequacy 04/05/2023 Satisfactory for evaluation  Scant squamous component  • Additional Information 04/05/2023                      Value: This result contains rich text formatting which cannot be displayed here  • Case Report 04/05/2023                      Value:Surgical Pathology Report                         Case: [id number]                                   Authorizing Provider:  Laly Helton MD     Collected:           04/05/2023 1615              Ordering Location:     [place] [address]      Received:            04/05/2023 [address] [place] [address]                                                    Pathologist:           Natalia Maxwell MD                                                          Specimen:    Endometrium, endometrial currettings                                                      • Final Diagnosis 04/05/2023                      Value: This result contains rich text formatting which cannot be displayed here  • Note 04/05/2023                      Value: This result contains rich text formatting which cannot be displayed here  • Additional Information 04/05/2023                      Value: This result contains rich text formatting which cannot be displayed here  • Gross Description 04/05/2023                      Value: This result contains rich text formatting which cannot be displayed here  • Clinical Information 04/05/2023                      Value:polyp - transvaginal US showing endometrial thickining to 20mm in post menopausal female   • Case Report 04/05/2023                      Value:Surgical Pathology Report                         Case: [id number]                                   Authorizing Provider:  Joyce Smith MD     Collected:           04/05/2023 1616              Ordering Location:     [place] [address]      Received:            04/05/2023 [facility]                                                    Pathologist:           Jarrett Kellogg DO                                                     Specimen:    Endometrium, polyp                                                                        • Final Diagnosis 04/05/2023                      Value: This result contains rich text formatting which cannot be displayed here  • Additional Information 04/05/2023                      Value: This result contains rich text formatting which cannot be displayed here  • Gross Description 04/05/2023                      Value: This result contains rich text formatting which cannot be displayed here  • HPV Other HR 04/05/2023 Negative    • HPV16 04/05/2023 Negative    • HPV18 04/05/2023 Negative          Radiology Results:   US pelvis complete w transvaginal    Result Date: 5/5/2023  Narrative: PELVIC ULTRASOUND, COMPLETE INDICATION:  N95 0: Postmenopausal bleeding  The patient is 62years old   COMPARISON: Pelvic ultrasound 2/28/2023 TECHNIQUE:   Transabdominal pelvic ultrasound was performed in sagittal and transverse planes with a curvilinear transducer  Additional transvaginal imaging was performed to better evaluate the endometrium and ovaries  Imaging included volumetric sweeps as well as traditional still imaging technique  FINDINGS: UTERUS: The uterus is anteverted in position, measuring 9 5 x 5 0 x 6 9 cm  Heterogeneous myometrium  2 5 x 2 3 x 2 8 cm lower uterine segment fibroid, previously measured 3 5 x 2 8 x 2 3 cm  The cervix appears within normal limits  ENDOMETRIUM: The endometrial echo complex has an combined thickness of approximately 10-11 mm  Previously this measured about 20 mm  Tiny amount of endometrial fluid suggested  No focal pathology apparent  OVARIES/ADNEXA: Right ovary:  2 2 x 2 3 x 1 0 cm  2 6 mL  Left ovary:  2 2 x 1 4 x 1 1 cm  1 9 mL  Ovarian Doppler flow is within normal limits  No suspicious ovarian or adnexal abnormality  OTHER: No free fluid or loculated fluid collections  Impression: Residual endometrial thickening albeit decreased from prior study  Tiny amount of endometrial fluid but no discrete pathology  Continued ultrasound surveillance would be prudent  Heterogeneous uterus with small fibroid again noted   Workstation performed: [id number]     Answers for HPI/ROS submitted by the patient on 5/21/2023  Chronicity: new  Onset: more than 1 month ago  Onset quality: undetermined  Frequency: intermittently  Episode duration: 3 Hours  Progression since onset: unchanged  Pain location: LLQ  Pain - numeric: 3/10  Pain quality: aching  Radiates to: LLQ  anorexia: No  arthralgias: Yes  belching: No  constipation: No  diarrhea: No  dysuria: No  fever: No  flatus: Yes  frequency: No  headaches: No  hematochezia: No  hematuria: No  melena: No  myalgias: Yes  nausea: Yes  weight loss: No  vomiting: Yes  Aggravated by: nothing  Relieved by: being still  Diagnostic workup: GI consult, lower endoscopy, ultrasound

## 2023-05-23 NOTE — PROGRESS NOTES
[facility] - Outpatient Consultation  Mariano Silva 62 y o  female MRN: [id number]  Encounter: [id number]          ASSESSMENT AND PLAN:      1  Intermittent dysphagia  Patient presenting currently with dysphagia mainly to solids ongoing for about a year and a half  Dysphagia happens intermittently  Regurgitation happens intermittently  Chest pain is denied  Weight loss is denied  Eckardt score 2  She describes like his food may get stuck in her neck area  Her symptoms are more concerning for oropharyngeal dysphagia  She was seen by her PCP, note was reviewed, esophagram was ordered, has not been performed yet  We will perform endoscopy, if endoscopy is otherwise normal, will order video barium swallow  Follow-up in 3 months      2  Screen for colon cancer  - Patient is 62 y o , has not been screened for colon cancer in the past, denies any family history of GI malignancy or IBD, no alarm symptoms at this point, currently having normal bowel movements  - Currently average risk for colonoscopy, other options for colorectal cancer screening were discussed with the patient, will perform colonoscopy, risk and benefits of the procedure were discussed with the patient including but not limited to bleeding, infection, perforation and missing an adenoma   ______________________________________________________________________    HPI:  Patient seen and examined, he is a 51-year-old female patient with history of ongoing dysphagia for about a year and a half, otherwise she denies any recent events, currently is tolerating PO route with intermittent dysphagia, denies nausea or vomiting, is passing flatus and having bowel movements, describes stool as [place] stool chart #3-5, denies abdominal pain, no recent weight loss      REVIEW OF SYSTEMS:    CONSTITUTIONAL: Denies any fever, chills, or weight loss  HEENT: No earache or visual disturbances  CARDIOVASCULAR: No chest pain or palpitations  "  RESPIRATORY: Denies shortness of breath or dyspnea on exertion  GASTROINTESTINAL: As noted in the History of Present Illness  GENITOURINARY: No problems with urination  NEUROLOGIC: No dizziness or headaches  MUSCULOSKELETAL: No muscle or joint pain  SKIN: No skin rashes or itching  ENDOCRINE: No intolerance to heat or cold  Historical Information   History reviewed  No pertinent past medical history  History reviewed  No pertinent surgical history  Social History   Social History     Substance and Sexual Activity   Alcohol Use Never     Social History     Substance and Sexual Activity   Drug Use Never     Social History     Tobacco Use   Smoking Status Never   Smokeless Tobacco Never     History reviewed  No pertinent family history  Meds/Allergies       Current Outpatient Medications:   •  bisacodyl (DULCOLAX) 5 mg EC tablet  •  calcium carbonate (TUMS) 500 mg chewable tablet  •  pantoprazole (PROTONIX) 20 mg tablet  •  polyethylene glycol (GOLYTELY) 4000 mL solution  •  polyethylene glycol (GOLYTELY) 4000 mL solution    No Known Allergies        Objective     Blood pressure 112/78, temperature 97 5 °F (36 4 °C), temperature source Tympanic, height 5' 2 5\" (1 588 m), weight 87 4 kg (192 lb 9 6 oz)  Body mass index is 34 67 kg/m²  PHYSICAL EXAM:      General Appearance:   Alert, cooperative, no distress   HEENT:   Normocephalic, atraumatic, anicteric  Neck:  Supple, symmetrical, trachea midline   Lungs:   Clear to auscultation bilaterally; no rales, rhonchi or wheezing; respirations unlabored    Heart[de-identified]   Regular rate and rhythm     Abdomen:   Soft, non-tender, non-distended; normal bowel sounds; no masses, no organomegaly    Genitalia:   Deferred    Rectal:   Deferred    Extremities:  No cyanosis or edema                  Lymph nodes: No palpable cervical lymphadenopathy   Skin:  No jaundice, rashes, or lesions              Lab Results:   No visits with results within 1 Day(s) from " this visit  Latest known visit with results is:   Office Visit on 04/05/2023   Component Date Value   • Case Report 04/05/2023                      Value:Gynecologic Cytology Report                       Case: [id number]                                  Authorizing Provider:  Dante Emerson MD     Collected:           04/05/2023 1615              Ordering Location:     [facility]      Received:            04/05/2023 1616                                     [facility]                                                    First Screen:          Gracia Ruiz, CT                                                       Specimen:    LIQUID-BASED PAP, SCREENING, Cervix                                                       • Primary Interpretation 04/05/2023 Negative for intraepithelial lesion or malignancy    • Specimen Adequacy 04/05/2023 Satisfactory for evaluation  Scant squamous component  • Additional Information 04/05/2023                      Value: This result contains rich text formatting which cannot be displayed here  • Case Report 04/05/2023                      Value:Surgical Pathology Report                         Case: [id number]                                   Authorizing Provider:  Dante Emerson MD     Collected:           04/05/2023 1615              Ordering Location:     [facility]      Received:            04/05/2023 [address] [place] [address]                                                    Pathologist:           Lukas Mccurdy MD                                                          Specimen:    Endometrium, endometrial currettings                                                      • Final Diagnosis 04/05/2023                      Value: This result contains rich text formatting which cannot be displayed here  • Note 04/05/2023                      Value: This result contains rich text formatting which cannot be displayed here  • Additional Information 04/05/2023                      Value: This result contains rich text formatting which cannot be displayed here  • Gross Description 04/05/2023                      Value: This result contains rich text formatting which cannot be displayed here  • Clinical Information 04/05/2023                      Value:polyp - transvaginal US showing endometrial thickining to 20mm in post menopausal female   • Case Report 04/05/2023                      Value:Surgical Pathology Report                         Case: [id number]                                   Authorizing Provider:  Ginette Jones MD     Collected:           04/05/2023 1616              Ordering Location:     [place] [address]      Received:            [facility]05/[facility]23 [facility]                                                    Pathologist:           Buddy Agosto DO                                                     Specimen:    Endometrium, polyp                                                                        • Final Diagnosis 04/05/2023                      Value: This result contains rich text formatting which cannot be displayed here  • Additional Information 04/05/2023                      Value: This result contains rich text formatting which cannot be displayed here  • Gross Description 04/05/2023                      Value: This result contains rich text formatting which cannot be displayed here  • HPV Other HR 04/05/2023 Negative    • HPV16 04/05/2023 Negative    • HPV18 04/05/2023 Negative          Radiology Results:   US pelvis complete w transvaginal    Result Date: 5/5/2023  Narrative: PELVIC ULTRASOUND, COMPLETE INDICATION:  N95 0: Postmenopausal bleeding  The patient is 62years old   COMPARISON: Pelvic ultrasound 2/28/2023 TECHNIQUE:   Transabdominal pelvic ultrasound was performed in sagittal and transverse planes with a curvilinear transducer  Additional transvaginal imaging was performed to better evaluate the endometrium and ovaries  Imaging included volumetric sweeps as well as traditional still imaging technique  FINDINGS: UTERUS: The uterus is anteverted in position, measuring 9 5 x 5 0 x 6 9 cm  Heterogeneous myometrium  2 5 x 2 3 x 2 8 cm lower uterine segment fibroid, previously measured 3 5 x 2 8 x 2 3 cm  The cervix appears within normal limits  ENDOMETRIUM: The endometrial echo complex has an combined thickness of approximately 10-11 mm  Previously this measured about 20 mm  Tiny amount of endometrial fluid suggested  No focal pathology apparent  OVARIES/ADNEXA: Right ovary:  2 2 x 2 3 x 1 0 cm  2 6 mL  Left ovary:  2 2 x 1 4 x 1 1 cm  1 9 mL  Ovarian Doppler flow is within normal limits  No suspicious ovarian or adnexal abnormality  OTHER: No free fluid or loculated fluid collections  Impression: Residual endometrial thickening albeit decreased from prior study  Tiny amount of endometrial fluid but no discrete pathology  Continued ultrasound surveillance would be prudent  Heterogeneous uterus with small fibroid again noted   Workstation performed: [id number]     Answers for HPI/ROS submitted by the patient on 5/21/2023  Chronicity: new  Onset: more than 1 month ago  Onset quality: undetermined  Frequency: intermittently  Episode duration: 3 Hours  Progression since onset: unchanged  Pain location: LLQ  Pain - numeric: 3/10  Pain quality: aching  Radiates to: LLQ  anorexia: No  arthralgias: Yes  belching: No  constipation: No  diarrhea: No  dysuria: No  fever: No  flatus: Yes  frequency: No  headaches: No  hematochezia: No  hematuria: No  melena: No  myalgias: Yes  nausea: Yes  weight loss: No  vomiting: Yes  Aggravated by: nothing  Relieved by: being still  Diagnostic workup: GI consult, lower endoscopy, ultrasound

## 2023-05-30 ENCOUNTER — CONSULT (OUTPATIENT)
Dept: GYNECOLOGIC ONCOLOGY | Facility: CLINIC | Age: 58
End: 2023-05-30

## 2023-05-30 VITALS
SYSTOLIC BLOOD PRESSURE: 108 MMHG | DIASTOLIC BLOOD PRESSURE: 60 MMHG | BODY MASS INDEX: 34.02 KG/M2 | HEART RATE: 88 BPM | WEIGHT: 192 LBS | TEMPERATURE: 97.3 F | OXYGEN SATURATION: 98 % | HEIGHT: 63 IN

## 2023-05-30 DIAGNOSIS — N95.0 POSTMENOPAUSAL VAGINAL BLEEDING: Primary | ICD-10-CM

## 2023-05-30 RX ORDER — IBUPROFEN 200 MG
200 TABLET ORAL AS NEEDED
COMMUNITY

## 2023-05-30 NOTE — ASSESSMENT & PLAN NOTE
58yo with PMB presents for consultation  We discussed the etiology of post menopausal bleeding including vaginal atrophy, polyps, fibroids, hyperplasia and/or overt malignancy  Most concerning would be hyperplasia or carcinoma of which treatment is most commonly surgical with hysterectomy  She has had an EMB which was benign and strophic however her EMS remains thickened  We discussed continued observation with repeat US vs definitive work up with D+C  Surgical risks were reviewed with the patient including infection, blood loss, transfusion, damage to other organs, uterine perforation and possible need for additional procedures  Postoperative recovery was reviewed  The patient was given time to ask pertinent questions and would like to proceed with the procedure  Other alternatives including nonsurgical options reviewed with patients  Questions answered       Consents signed

## 2023-05-31 ENCOUNTER — APPOINTMENT (OUTPATIENT)
Dept: RADIOLOGY | Facility: CLINIC | Age: 58
End: 2023-05-31
Payer: COMMERCIAL

## 2023-05-31 ENCOUNTER — OFFICE VISIT (OUTPATIENT)
Dept: PODIATRY | Facility: CLINIC | Age: 58
End: 2023-05-31

## 2023-05-31 VITALS
DIASTOLIC BLOOD PRESSURE: 63 MMHG | HEART RATE: 58 BPM | BODY MASS INDEX: 34.38 KG/M2 | SYSTOLIC BLOOD PRESSURE: 130 MMHG | WEIGHT: 194 LBS | HEIGHT: 63 IN

## 2023-05-31 DIAGNOSIS — M76.72 PERONEAL TENDINITIS OF LEFT LOWER EXTREMITY: ICD-10-CM

## 2023-05-31 DIAGNOSIS — M79.672 LEFT FOOT PAIN: ICD-10-CM

## 2023-05-31 DIAGNOSIS — M25.572 ACUTE LEFT ANKLE PAIN: Primary | ICD-10-CM

## 2023-05-31 PROCEDURE — 73610 X-RAY EXAM OF ANKLE: CPT

## 2023-05-31 PROCEDURE — 73630 X-RAY EXAM OF FOOT: CPT

## 2023-05-31 NOTE — PATIENT INSTRUCTIONS
DISCHARGE INSTRUCTIONS:   Call your doctor or physical therapist if:   Your pain and swelling increase  You develop new knee, hip, or back pain  You have questions or concerns about your condition or care  How to do plantar fasciitis exercises:  Ask your healthcare provider when to start these exercises and how often to do them  Slant board stretch:  Stand on a slanted board with your toes higher than your heel  Press your heel into the board  Keep your knee slightly bent  Hold this position for 1 minute  Repeat 5 times  Heel stretch:  Stand up straight with your hands on a wall  Place your injured leg slightly behind your other leg  Keep your heels flat on the floor, lean forward, and bend both knees  Hold for 30 seconds  Calf stretch:  Stand up straight with your hands on a wall  Step forward so that your uninjured foot is in front of your injured foot  Keep your front leg bent and your back leg straight  Gently lean forward until you feel your calf stretch  Hold for 30 seconds and then relax  Seated plantar fascia stretch:  Sit on a firm surface, such as the floor or a mat  Extend your legs out in front of you  Raise your injured foot a few inches off the ground  Keep your leg straight  Grab the toes of your injured foot and pull them toward you  With your other hand, feel your plantar fascia  You should feel it press outward  Hold for 30 seconds  If you cannot reach your toes, loop a towel or tie around your foot  Gently pull on the towel or tie and flex your toes toward you  Heel raises:  Stand on the injured leg  Raise your other leg off the ground  Hold onto a railing or wall for balance  Slowly rise up on the toes of your injured leg  Hold for 5 seconds  Slowly lower your heel to the ground  Toe curls:  Place a towel on the floor  Put your foot flat on the towel  Grab the towel with your toes by curling them around the towel   Lift the towel up with your toes  Toe taps:  Sit down and place your foot flat on the floor  Keep your heel on the floor  Point all your toes up toward the ceiling  While the 4 smaller toes are pointed up, bend your big toe down and tap it on the ground  Do 10 to 50 taps  Point all 5 toes up toward the ceiling again  This time keep your big toe pointed up and tap the 4 smaller toes on the ground  Do 10 to 50 taps each time  Follow up with your doctor or physical therapist as directed:  Write down your questions so you remember to ask them during your visits  © Copyright [employer] 2022 Information is for End User's use only and may not be sold, redistributed or otherwise used for commercial purposes  The above information is an  only  It is not intended as medical advice for individual conditions or treatments  Talk to your doctor, nurse or pharmacist before following any medical regimen to see if it is safe and effective for you

## 2023-06-01 NOTE — PROGRESS NOTES
Assessment/Plan:    No problem-specific Assessment & Plan notes found for this encounter  Diagnoses and all orders for this visit:    Acute left ankle pain  -     X-ray ankle left 3+ views    Left foot pain  -     X-ray foot left 3+ views; Future    Peroneal tendinitis of left lower extremity      Plan:     - Diagnosis and treatments discussed with patient   - left ankle/foot xrays reviewed and interpreted with patient: no acute osseous abnormalities noted  There is calcaneal posterior and plantar heel spur noted  - Patient exhibits left foot peroneal tendonitis  I recommended conservative treatments including wearing supportive shoe gear with ankle braces if needed, provided RICE protocol, home stretching and strengthening exercises    -May take over-the-counter PO NSAIDs    - Discussed formal PT evaluation, pt will like to start with home PT first    - Return in 4 weeks or as needed for follow up  - Addressed all questions and concerns  - Call if any questions  Subjective:      Patient ID: Anusha Aguila is a 62 y o  female  80-year-old female with past medical history as below presents for an evaluation of left foot and ankle pain and swelling  Patient reports she has discomfort when her lower extremity swells  Patient reports the swelling is usually after activities being on her feet for long operative time  She denies any trauma to this area  No other complaints  The following portions of the patient's history were reviewed and updated as appropriate:   She  has no past medical history on file    She   Patient Active Problem List    Diagnosis Date Noted   • Intermittent dysphagia 04/19/2023   • Screening for colon cancer 04/19/2023   • Polyp at cervical os 04/05/2023   • Postmenopausal vaginal bleeding 02/10/2023   • Gastroesophageal reflux disease 02/10/2023   • Other tear of medial meniscus, current injury, left knee, initial encounter 11/21/2022     She  has no past surgical history "on file       Review of Systems   All other systems reviewed and are negative  Objective:      /63   Pulse 58   Ht 5' 2 5\" (1 588 m)   Wt 88 kg (194 lb)   BMI 34 92 kg/m²          Physical Exam  Vitals reviewed  Musculoskeletal:      Left ankle: Tenderness present  Left foot: Tenderness present  Comments: Discomfort with palpation noted to the left lateral foot and ankle along the peroneal tendons  No pain with palpation to the ATFL region  +2 pitting edema noted throughout the bilateral lower extremities  No other obvious deformities noted at this time           "

## 2023-06-05 ENCOUNTER — TELEPHONE (OUTPATIENT)
Dept: HEMATOLOGY ONCOLOGY | Facility: CLINIC | Age: 58
End: 2023-06-05

## 2023-06-05 NOTE — TELEPHONE ENCOUNTER
Patient Call    Who are you speaking with? Patient    If it is not the patient, are they listed on an active communication consent form? N/A   What is the reason for this call? Patient is requesting to get a copy of a note stating she is getting surgery done on 8/29/23   Does this require a call back? Yes   If a call back is required, please list best call back number [phone]   If a call back is required, advise that a message will be forwarded to their care team and someone will return their call as soon as possible  Did you relay this information to the patient?  Yes

## 2023-06-12 DIAGNOSIS — Z12.11 SCREEN FOR COLON CANCER: ICD-10-CM

## 2023-06-13 RX ORDER — SODIUM CHLORIDE 9 MG/ML
125 INJECTION, SOLUTION INTRAVENOUS CONTINUOUS
Status: CANCELLED | OUTPATIENT
Start: 2023-06-13

## 2023-06-14 ENCOUNTER — ANESTHESIA EVENT (OUTPATIENT)
Dept: GASTROENTEROLOGY | Facility: MEDICAL CENTER | Age: 58
End: 2023-06-14

## 2023-06-14 ENCOUNTER — ANESTHESIA (OUTPATIENT)
Dept: GASTROENTEROLOGY | Facility: MEDICAL CENTER | Age: 58
End: 2023-06-14

## 2023-06-14 ENCOUNTER — HOSPITAL ENCOUNTER (OUTPATIENT)
Dept: GASTROENTEROLOGY | Facility: MEDICAL CENTER | Age: 58
Setting detail: OUTPATIENT SURGERY
Discharge: HOME | End: 2023-06-14
Payer: COMMERCIAL

## 2023-06-14 VITALS
TEMPERATURE: 97.6 F | HEART RATE: 54 BPM | DIASTOLIC BLOOD PRESSURE: 55 MMHG | SYSTOLIC BLOOD PRESSURE: 116 MMHG | OXYGEN SATURATION: 98 % | RESPIRATION RATE: 16 BRPM

## 2023-06-14 DIAGNOSIS — R13.19 INTERMITTENT DYSPHAGIA: ICD-10-CM

## 2023-06-14 DIAGNOSIS — K20.90 ESOPHAGITIS DETERMINED BY ENDOSCOPY: Primary | ICD-10-CM

## 2023-06-14 DIAGNOSIS — K21.9 GASTROESOPHAGEAL REFLUX DISEASE, UNSPECIFIED WHETHER ESOPHAGITIS PRESENT: ICD-10-CM

## 2023-06-14 DIAGNOSIS — Z12.11 SCREEN FOR COLON CANCER: ICD-10-CM

## 2023-06-14 PROCEDURE — 43239 EGD BIOPSY SINGLE/MULTIPLE: CPT | Performed by: INTERNAL MEDICINE

## 2023-06-14 PROCEDURE — 43249 ESOPH EGD DILATION <30 MM: CPT | Performed by: INTERNAL MEDICINE

## 2023-06-14 PROCEDURE — 88305 TISSUE EXAM BY PATHOLOGIST: CPT | Performed by: STUDENT IN AN ORGANIZED HEALTH CARE EDUCATION/TRAINING PROGRAM

## 2023-06-14 PROCEDURE — 45385 COLONOSCOPY W/LESION REMOVAL: CPT | Performed by: INTERNAL MEDICINE

## 2023-06-14 PROCEDURE — C1726 CATH, BAL DIL, NON-VASCULAR: HCPCS

## 2023-06-14 RX ORDER — SODIUM CHLORIDE 9 MG/ML
125 INJECTION, SOLUTION INTRAVENOUS CONTINUOUS
Status: DISCONTINUED | OUTPATIENT
Start: 2023-06-14 | End: 2023-06-18 | Stop reason: HOSPADM

## 2023-06-14 RX ORDER — PROPOFOL 10 MG/ML
INJECTION, EMULSION INTRAVENOUS AS NEEDED
Status: DISCONTINUED | OUTPATIENT
Start: 2023-06-14 | End: 2023-06-14

## 2023-06-14 RX ORDER — SUCRALFATE ORAL 1 G/10ML
1 SUSPENSION ORAL 4 TIMES DAILY
Qty: 280 ML | Refills: 0 | Status: SHIPPED | OUTPATIENT
Start: 2023-06-14 | End: 2023-06-21

## 2023-06-14 RX ORDER — PANTOPRAZOLE SODIUM 40 MG/1
40 TABLET, DELAYED RELEASE ORAL DAILY
Qty: 30 TABLET | Refills: 2 | Status: SHIPPED | OUTPATIENT
Start: 2023-06-14 | End: 2023-09-12

## 2023-06-14 RX ORDER — LIDOCAINE HYDROCHLORIDE 20 MG/ML
INJECTION, SOLUTION EPIDURAL; INFILTRATION; INTRACAUDAL; PERINEURAL AS NEEDED
Status: DISCONTINUED | OUTPATIENT
Start: 2023-06-14 | End: 2023-06-14

## 2023-06-14 RX ADMIN — PROPOFOL 50 MG: 10 INJECTION, EMULSION INTRAVENOUS at 14:02

## 2023-06-14 RX ADMIN — PROPOFOL 120 MG: 10 INJECTION, EMULSION INTRAVENOUS at 13:51

## 2023-06-14 RX ADMIN — PROPOFOL 50 MG: 10 INJECTION, EMULSION INTRAVENOUS at 13:57

## 2023-06-14 RX ADMIN — PROPOFOL 50 MG: 10 INJECTION, EMULSION INTRAVENOUS at 14:11

## 2023-06-14 RX ADMIN — LIDOCAINE HYDROCHLORIDE 5 ML: 20 INJECTION, SOLUTION EPIDURAL; INFILTRATION; INTRACAUDAL at 13:51

## 2023-06-14 RX ADMIN — PROPOFOL 50 MG: 10 INJECTION, EMULSION INTRAVENOUS at 14:32

## 2023-06-14 RX ADMIN — PROPOFOL 50 MG: 10 INJECTION, EMULSION INTRAVENOUS at 14:25

## 2023-06-14 RX ADMIN — SODIUM CHLORIDE 125 ML/HR: 0.9 INJECTION, SOLUTION INTRAVENOUS at 13:41

## 2023-06-14 RX ADMIN — PROPOFOL 50 MG: 10 INJECTION, EMULSION INTRAVENOUS at 14:16

## 2023-06-14 RX ADMIN — PROPOFOL 50 MG: 10 INJECTION, EMULSION INTRAVENOUS at 14:05

## 2023-06-14 NOTE — ANESTHESIA PREPROCEDURE EVALUATION
Procedure:  COLONOSCOPY  EGD    Relevant Problems   GI/HEPATIC   (+) Gastroesophageal reflux disease   (+) Intermittent dysphagia        Physical Exam    Airway    Mallampati score: II  TM Distance: >3 FB  Neck ROM: full     Dental   No notable dental hx     Cardiovascular  Cardiovascular exam normal    Pulmonary  Pulmonary exam normal     Other Findings        Anesthesia Plan  ASA Score- 2     Anesthesia Type- IV sedation with anesthesia with ASA Monitors  Additional Monitors:   Airway Plan:           Plan Factors-Exercise tolerance (METS): >4 METS  Chart reviewed  Patient is not a current smoker  Patient instructed to abstain from smoking on day of procedure  Patient did not smoke on day of surgery  Obstructive sleep apnea risk education given perioperatively  Induction- intravenous  Postoperative Plan-     Informed Consent- Anesthetic plan and risks discussed with patient

## 2023-06-14 NOTE — ANESTHESIA POSTPROCEDURE EVALUATION
Post-Op Assessment Note    CV Status:  Stable    Pain management: adequate     Mental Status:  Alert and awake   Hydration Status:  Euvolemic   PONV Controlled:  Controlled   Airway Patency:  Patent      Post Op Vitals Reviewed: Yes            No notable events documented      BP      Temp      Pulse    Resp      SpO2      /55   Pulse (!) 54   Temp 97 6 °F (36 4 °C) (Temporal)   Resp 16   SpO2 98%

## 2023-06-18 PROBLEM — Z12.11 SCREENING FOR COLON CANCER: Status: RESOLVED | Noted: 2023-04-19 | Resolved: 2023-06-18

## 2023-06-19 PROCEDURE — 88305 TISSUE EXAM BY PATHOLOGIST: CPT | Performed by: STUDENT IN AN ORGANIZED HEALTH CARE EDUCATION/TRAINING PROGRAM

## 2023-06-28 ENCOUNTER — PREP FOR PROCEDURE (OUTPATIENT)
Dept: GASTROENTEROLOGY | Facility: CLINIC | Age: 58
End: 2023-06-28

## 2023-06-28 DIAGNOSIS — R13.19 INTERMITTENT DYSPHAGIA: Primary | ICD-10-CM

## 2023-06-29 ENCOUNTER — TELEPHONE (OUTPATIENT)
Dept: GASTROENTEROLOGY | Facility: CLINIC | Age: 58
End: 2023-06-29

## 2023-06-29 NOTE — TELEPHONE ENCOUNTER
----- Message -----  From: Beverly Anderson MD  Sent: 6/28/2023   6:05 PM EDT  To: Deloris Holliday RN; #    Results were reviewed and the patient was made aware via My Chart, thanks     Please schedule EGD in 1 to 2 months for repeat dilation, order was placed

## 2023-08-04 ENCOUNTER — OFFICE VISIT (OUTPATIENT)
Dept: OBGYN CLINIC | Facility: MEDICAL CENTER | Age: 58
End: 2023-08-04
Payer: COMMERCIAL

## 2023-08-04 ENCOUNTER — APPOINTMENT (OUTPATIENT)
Dept: RADIOLOGY | Facility: MEDICAL CENTER | Age: 58
End: 2023-08-04
Payer: COMMERCIAL

## 2023-08-04 VITALS
HEIGHT: 63 IN | DIASTOLIC BLOOD PRESSURE: 70 MMHG | WEIGHT: 194 LBS | SYSTOLIC BLOOD PRESSURE: 105 MMHG | HEART RATE: 54 BPM | BODY MASS INDEX: 34.38 KG/M2

## 2023-08-04 DIAGNOSIS — M17.0 BILATERAL PRIMARY OSTEOARTHRITIS OF KNEE: ICD-10-CM

## 2023-08-04 DIAGNOSIS — Z01.89 ENCOUNTER FOR LOWER EXTREMITY COMPARISON IMAGING STUDY: ICD-10-CM

## 2023-08-04 DIAGNOSIS — M25.562 LEFT KNEE PAIN, UNSPECIFIED CHRONICITY: ICD-10-CM

## 2023-08-04 DIAGNOSIS — M25.562 LEFT KNEE PAIN, UNSPECIFIED CHRONICITY: Primary | ICD-10-CM

## 2023-08-04 PROCEDURE — 99213 OFFICE O/P EST LOW 20 MIN: CPT | Performed by: ORTHOPAEDIC SURGERY

## 2023-08-04 PROCEDURE — 73564 X-RAY EXAM KNEE 4 OR MORE: CPT

## 2023-08-04 PROCEDURE — 73562 X-RAY EXAM OF KNEE 3: CPT

## 2023-08-04 NOTE — PROGRESS NOTES
Ortho Sports Medicine Knee New Patient Visit     Assesment:   right knee severe media compartment osteoarthritis and  Left knee mild osteoarthritis with possible meniscal tear     Plan: Will be ordering bilateral knee Monovisc injections. May consider ordering left knee MRI to r/o meniscal tear in the future     Conservative treatment:    OTC NSAIDS prn for pain. Imaging: All imaging from today was reviewed by myself and explained to the patient. Injection:    A viscosupplementation injection was ordered and will be given at a future visit. Surgery:     No surgery is recommended at this point, continue with conservative treatment plan as noted. Follow up:    No follow-ups on file. Chief Complaint   Patient presents with   • Left Knee - Pain       History of Present Illness: The patient is a 62 y.o. female whose occupation is retired, referred to me by their primary care physician, seen in clinic for consultation of Bilateral  knee pain. She states she has hx of left knee steroid injection and aspiration  in the past by Dr. Calvin Horton at [facility] about 2 years ago with no relief. . She states left knee is worse than the right. Pain is located anterior, medial.  The patient rates the pain as a 6/10. The pain has been present for a few years. The patient sustained no injury . The pain is characterized as dull, achy and stiffness. The pain is present at all times. Pain is improved by rest and ice. Pain is aggravated by stairs, squatting, weight bearing, walking, sitting and standing. Symptoms include clicking and swelling. The patient has tried ice, NSAIDS, bracing and injection.           Knee Surgical History:  None    Past Medical, Social and Family History:  Past Medical History:   Diagnosis Date   • GERD (gastroesophageal reflux disease)      Past Surgical History:   Procedure Laterality Date   •  SECTION       No Known Allergies  Current Outpatient Medications on File Prior to Visit   Medication Sig Dispense Refill   • calcium carbonate (TUMS) 500 mg chewable tablet Chew 1 tablet daily     • ibuprofen (MOTRIN) 200 mg tablet Take 200 mg by mouth as needed for mild pain     • pantoprazole (PROTONIX) 40 mg tablet Take 1 tablet (40 mg total) by mouth daily 30 tablet 2   • bisacodyl (DULCOLAX) 5 mg EC tablet Take 4 tablets (20 mg total) by mouth once for 1 dose 4 tablet 0   • polyethylene glycol (GOLYTELY) 4000 mL solution Take 4,000 mL by mouth once for 1 dose 4000 mL 0   • polyethylene glycol (GOLYTELY) 4000 mL solution Take 4,000 mL by mouth once for 1 dose Please follow instructions as provided by office 4000 mL 0   • sucralfate (CARAFATE) 1 g/10 mL suspension Take 10 mL (1 g total) by mouth 4 (four) times a day for 7 days 280 mL 0     No current facility-administered medications on file prior to visit. Social History     Socioeconomic History   • Marital status:      Spouse name: Not on file   • Number of children: Not on file   • Years of education: Not on file   • Highest education level: Not on file   Occupational History   • Not on file   Tobacco Use   • Smoking status: Never   • Smokeless tobacco: Never   Vaping Use   • Vaping Use: Never used   Substance and Sexual Activity   • Alcohol use: Never   • Drug use: Never   • Sexual activity: Not on file   Other Topics Concern   • Not on file   Social History Narrative   • Not on file     Social Determinants of Health     Financial Resource Strain: Low Risk  (2/10/2023)    Overall Financial Resource Strain (CARDIA)    • Difficulty of Paying Living Expenses: Not very hard   Food Insecurity: No Food Insecurity (2/10/2023)    Hunger Vital Sign    • Worried About Running Out of Food in the Last Year: Never true    • Ran Out of Food in the Last Year: Never true   Transportation Needs: No Transportation Needs (2/10/2023)    PRAPARE - Transportation    • Lack of Transportation (Medical):  No    • Lack of Transportation (Non-Medical): No   Physical Activity: Insufficiently Active (2/10/2023)    Exercise Vital Sign    • Days of Exercise per Week: 3 days    • Minutes of Exercise per Session: 20 min   Stress: Stress Concern Present (2/10/2023)    [facility]    • Feeling of Stress : To some extent   Social Connections: Not on file   Intimate Partner Violence: Not At Risk (2/10/2023)    Humiliation, Afraid, Rape, and Kick questionnaire    • Fear of Current or Ex-Partner: No    • Emotionally Abused: No    • Physically Abused: No    • Sexually Abused: No   Housing Stability: Low Risk  (2/10/2023)    Housing Stability Vital Sign    • Unable to Pay for Housing in the Last Year: No    • Number of Places Lived in the Last Year: 1    • Unstable Housing in the Last Year: No         I have reviewed the past medical, surgical, social and family history, medications and allergies as documented in the EMR. Review of systems: ROS is negative other than that noted in the HPI. Constitutional: Negative for fatigue and fever. HENT: Negative for sore throat. Respiratory: Negative for shortness of breath. Cardiovascular: Negative for chest pain. Gastrointestinal: Negative for abdominal pain. Endocrine: Negative for cold intolerance and heat intolerance. Genitourinary: Negative for flank pain. Musculoskeletal: Negative for back pain. Skin: Negative for rash. Allergic/Immunologic: Negative for immunocompromised state. Neurological: Negative for dizziness. Psychiatric/Behavioral: Negative for agitation. Physical Exam:    Height 5' 2.5" (1.588 m), weight 88 kg (194 lb).     General/Constitutional: NAD, well developed, well nourished  HENT: Normocephalic, atraumatic  CV: Intact distal pulses, regular rate  Resp: No respiratory distress or labored breathing  Lymphatic: No lymphadenopathy palpated  Neuro: Alert and Oriented x 3, no focal deficits  Psych: Normal mood, normal affect, normal judgement, normal behavior  Skin: Warm, dry, no rashes, no erythema      Knee Exam (focused):                RIGHT LEFT   ROM:   0-130 0-130   Palpation: Effusion negative negative     MJL tenderness Positive Positive     LJL tenderness Negative Negative   Meniscus: Mary Negative Negative    Apley's Compression Negative Negative   Instability: Varus stable stable     Valgus stable stable   Special Tests: Lachman Negative Negative     Posterior drawer Negative Negative     Anterior drawer Negative Negative     Pivot shift not tested not tested     Dial not tested not tested   Patella: Palpation   no tenderness no tenderness     Mobility 1/4 1/4     Apprehension Negative Negative   Other: Single leg 1/4 squat not tested not tested      LE NV Exam: +2 DP/PT pulses bilaterally  Sensation intact to light touch L2-S1 bilaterally     Bilateral hip ROM demonstrates no pain actively or passively    No calf tenderness to palpation bilaterally    Knee Imaging    X-rays of the bilateral knee were reviewed, which demonstrate severe medial compartment osteoarthritis right knee and mild osteoarthritis left knee. .  I have reviewed the radiology report and do not currently have a radiology reading from  [facility], but will check the result once the reading is performed.         Scribe Attestation    I,:  Mari Jack am acting as a scribe while in the presence of the attending physician.:       I,:  Lucas Alva, DO personally performed the services described in this documentation    as scribed in my presence.:

## 2023-08-07 NOTE — PRE-PROCEDURE INSTRUCTIONS
Pre-Surgery Instructions:   Medication Instructions   • ibuprofen (MOTRIN) 200 mg tablet Stop taking 7 days prior to surgery. • pantoprazole (PROTONIX) 40 mg tablet Take day of surgery. Medication instructions for day surgery reviewed. Please use only a sip of water to take your instructed medications. Avoid all over the counter vitamins, supplements and NSAIDS for one week prior to surgery per anesthesia guidelines. Tylenol is ok to take as needed. You will receive a call one business day prior to surgery with an arrival time and hospital directions. If your surgery is scheduled on a Monday, the hospital will be calling you on the Friday prior to your surgery. If you have not heard from anyone by 8pm, please call the hospital supervisor through the hospital  at [phone]. Hernandez Kay [phone]). Do not eat or drink anything after midnight the night before your surgery, including candy, mints, lifesavers, or chewing gum. Do not drink alcohol 24hrs before your surgery. Try not to smoke at least 24hrs before your surgery. Follow the pre surgery showering instructions as listed in the [facility] Surgical Experience Booklet” or otherwise provided by your surgeon's office. Do not shave the surgical area 24 hours before surgery. Do not apply any lotions, creams, including makeup, cologne, deodorant, or perfumes after showering on the day of your surgery. No contact lenses, eye make-up, or artificial eyelashes. Remove nail polish, including gel polish, and any artificial, gel, or acrylic nails if possible. Remove all jewelry including rings and body piercing jewelry. Wear causal clothing that is easy to take on and off. Consider your type of surgery. Keep any valuables, jewelry, piercings at home. Please bring any specially ordered equipment (sling, braces) if indicated. Arrange for a responsible person to drive you to and from the hospital on the day of your surgery.  Visitor Guidelines discussed. Call the surgeon's office with any new illnesses, exposures, or additional questions prior to surgery. Please reference your [facility] Surgical Experience Booklet” for additional information to prepare for your upcoming surgery. Pt verbalized understanding of shower and med instructions. Pt instructed to stop nsaids and supplements one week prior to surgery.

## 2023-08-10 ENCOUNTER — HOSPITAL ENCOUNTER (OUTPATIENT)
Facility: HOSPITAL | Age: 58
Setting detail: OUTPATIENT SURGERY
Discharge: HOME | End: 2023-08-10
Attending: OBSTETRICS & GYNECOLOGY | Admitting: OBSTETRICS & GYNECOLOGY
Payer: COMMERCIAL

## 2023-08-10 ENCOUNTER — ANESTHESIA EVENT (OUTPATIENT)
Dept: PERIOP | Facility: HOSPITAL | Age: 58
End: 2023-08-10
Payer: COMMERCIAL

## 2023-08-10 ENCOUNTER — ANESTHESIA (OUTPATIENT)
Dept: PERIOP | Facility: HOSPITAL | Age: 58
End: 2023-08-10
Payer: COMMERCIAL

## 2023-08-10 VITALS
HEART RATE: 65 BPM | TEMPERATURE: 97.1 F | DIASTOLIC BLOOD PRESSURE: 60 MMHG | RESPIRATION RATE: 18 BRPM | OXYGEN SATURATION: 99 % | SYSTOLIC BLOOD PRESSURE: 111 MMHG

## 2023-08-10 DIAGNOSIS — N95.0 POSTMENOPAUSAL VAGINAL BLEEDING: ICD-10-CM

## 2023-08-10 PROCEDURE — 88305 TISSUE EXAM BY PATHOLOGIST: CPT | Performed by: PATHOLOGY

## 2023-08-10 PROCEDURE — 58558 HYSTEROSCOPY BIOPSY: CPT | Performed by: OBSTETRICS & GYNECOLOGY

## 2023-08-10 PROCEDURE — NC001 PR NO CHARGE: Performed by: OBSTETRICS & GYNECOLOGY

## 2023-08-10 RX ORDER — MAGNESIUM HYDROXIDE 1200 MG/15ML
LIQUID ORAL AS NEEDED
Status: DISCONTINUED | OUTPATIENT
Start: 2023-08-10 | End: 2023-08-10 | Stop reason: HOSPADM

## 2023-08-10 RX ORDER — DEXAMETHASONE SODIUM PHOSPHATE 10 MG/ML
INJECTION, SOLUTION INTRAMUSCULAR; INTRAVENOUS AS NEEDED
Status: DISCONTINUED | OUTPATIENT
Start: 2023-08-10 | End: 2023-08-10

## 2023-08-10 RX ORDER — ONDANSETRON 2 MG/ML
4 INJECTION INTRAMUSCULAR; INTRAVENOUS ONCE AS NEEDED
Status: DISCONTINUED | OUTPATIENT
Start: 2023-08-10 | End: 2023-08-10 | Stop reason: HOSPADM

## 2023-08-10 RX ORDER — EPHEDRINE SULFATE 50 MG/ML
INJECTION INTRAVENOUS AS NEEDED
Status: DISCONTINUED | OUTPATIENT
Start: 2023-08-10 | End: 2023-08-10

## 2023-08-10 RX ORDER — MIDAZOLAM HYDROCHLORIDE 2 MG/2ML
INJECTION, SOLUTION INTRAMUSCULAR; INTRAVENOUS AS NEEDED
Status: DISCONTINUED | OUTPATIENT
Start: 2023-08-10 | End: 2023-08-10

## 2023-08-10 RX ORDER — FENTANYL CITRATE 50 UG/ML
INJECTION, SOLUTION INTRAMUSCULAR; INTRAVENOUS AS NEEDED
Status: DISCONTINUED | OUTPATIENT
Start: 2023-08-10 | End: 2023-08-10

## 2023-08-10 RX ORDER — SODIUM CHLORIDE, SODIUM LACTATE, POTASSIUM CHLORIDE, CALCIUM CHLORIDE 600; 310; 30; 20 MG/100ML; MG/100ML; MG/100ML; MG/100ML
125 INJECTION, SOLUTION INTRAVENOUS CONTINUOUS
Status: DISCONTINUED | OUTPATIENT
Start: 2023-08-10 | End: 2023-08-10 | Stop reason: HOSPADM

## 2023-08-10 RX ORDER — LIDOCAINE HYDROCHLORIDE 10 MG/ML
0.5 INJECTION, SOLUTION EPIDURAL; INFILTRATION; INTRACAUDAL; PERINEURAL ONCE
Status: COMPLETED | OUTPATIENT
Start: 2023-08-10 | End: 2023-08-10

## 2023-08-10 RX ORDER — ONDANSETRON 2 MG/ML
INJECTION INTRAMUSCULAR; INTRAVENOUS AS NEEDED
Status: DISCONTINUED | OUTPATIENT
Start: 2023-08-10 | End: 2023-08-10

## 2023-08-10 RX ORDER — SODIUM CHLORIDE, SODIUM LACTATE, POTASSIUM CHLORIDE, CALCIUM CHLORIDE 600; 310; 30; 20 MG/100ML; MG/100ML; MG/100ML; MG/100ML
75 INJECTION, SOLUTION INTRAVENOUS CONTINUOUS
Status: CANCELLED | OUTPATIENT
Start: 2023-08-10

## 2023-08-10 RX ORDER — FENTANYL CITRATE/PF 50 MCG/ML
25 SYRINGE (ML) INJECTION
Status: DISCONTINUED | OUTPATIENT
Start: 2023-08-10 | End: 2023-08-10 | Stop reason: HOSPADM

## 2023-08-10 RX ORDER — LIDOCAINE HYDROCHLORIDE 10 MG/ML
INJECTION, SOLUTION EPIDURAL; INFILTRATION; INTRACAUDAL; PERINEURAL AS NEEDED
Status: DISCONTINUED | OUTPATIENT
Start: 2023-08-10 | End: 2023-08-10

## 2023-08-10 RX ORDER — KETOROLAC TROMETHAMINE 30 MG/ML
INJECTION, SOLUTION INTRAMUSCULAR; INTRAVENOUS AS NEEDED
Status: DISCONTINUED | OUTPATIENT
Start: 2023-08-10 | End: 2023-08-10

## 2023-08-10 RX ORDER — PROPOFOL 10 MG/ML
INJECTION, EMULSION INTRAVENOUS AS NEEDED
Status: DISCONTINUED | OUTPATIENT
Start: 2023-08-10 | End: 2023-08-10

## 2023-08-10 RX ADMIN — PROPOFOL 200 MG: 10 INJECTION, EMULSION INTRAVENOUS at 12:47

## 2023-08-10 RX ADMIN — EPHEDRINE SULFATE 7.5 MG: 50 INJECTION INTRAVENOUS at 12:49

## 2023-08-10 RX ADMIN — ONDANSETRON 4 MG: 2 INJECTION INTRAMUSCULAR; INTRAVENOUS at 12:47

## 2023-08-10 RX ADMIN — LIDOCAINE HYDROCHLORIDE 100 MG: 10 INJECTION, SOLUTION EPIDURAL; INFILTRATION; INTRACAUDAL; PERINEURAL at 12:47

## 2023-08-10 RX ADMIN — FENTANYL CITRATE 25 MCG: 50 INJECTION INTRAMUSCULAR; INTRAVENOUS at 13:28

## 2023-08-10 RX ADMIN — KETOROLAC TROMETHAMINE 30 MG: 30 INJECTION, SOLUTION INTRAMUSCULAR at 12:59

## 2023-08-10 RX ADMIN — FENTANYL CITRATE 25 MCG: 50 INJECTION, SOLUTION INTRAMUSCULAR; INTRAVENOUS at 12:47

## 2023-08-10 RX ADMIN — MIDAZOLAM 2 MG: 1 INJECTION INTRAMUSCULAR; INTRAVENOUS at 12:44

## 2023-08-10 RX ADMIN — FENTANYL CITRATE 25 MCG: 50 INJECTION INTRAMUSCULAR; INTRAVENOUS at 13:39

## 2023-08-10 RX ADMIN — LIDOCAINE HYDROCHLORIDE 0.5 ML: 10 INJECTION, SOLUTION EPIDURAL; INFILTRATION; INTRACAUDAL; PERINEURAL at 11:47

## 2023-08-10 RX ADMIN — EPHEDRINE SULFATE 5 MG: 50 INJECTION INTRAVENOUS at 12:53

## 2023-08-10 RX ADMIN — SODIUM CHLORIDE, SODIUM LACTATE, POTASSIUM CHLORIDE, AND CALCIUM CHLORIDE 125 ML/HR: .6; .31; .03; .02 INJECTION, SOLUTION INTRAVENOUS at 11:47

## 2023-08-10 RX ADMIN — DEXAMETHASONE SODIUM PHOSPHATE 10 MG: 10 INJECTION, SOLUTION INTRAMUSCULAR; INTRAVENOUS at 12:47

## 2023-08-10 NOTE — ANESTHESIA PREPROCEDURE EVALUATION
Procedure:  DILATATION AND CURETTAGE (D&C) WITH HYSTEROSCOPY (Uterus)    Relevant Problems   GI/HEPATIC   (+) Gastroesophageal reflux disease   (+) Intermittent dysphagia        Physical Exam    Airway    Mallampati score: II  TM Distance: <3 FB  Neck ROM: full     Dental   No notable dental hx     Cardiovascular  Cardiovascular exam normal    Pulmonary  Pulmonary exam normal     Other Findings        Anesthesia Plan  ASA Score- 2     Anesthesia Type- general with ASA Monitors. Additional Monitors:   Airway Plan: LMA. Comment: No issues with prev anesthetics  No cardiac/respiratory complaints. Plan Factors-Exercise tolerance (METS): >4 METS. Chart reviewed. Existing labs reviewed. Patient summary reviewed. Patient is not a current smoker. Induction- intravenous. Postoperative Plan- Plan for postoperative opioid use. Planned trial extubation    Informed Consent- Anesthetic plan and risks discussed with patient. I personally reviewed this patient with the CRNA. Discussed and agreed on the Anesthesia Plan with the CRNA. Cori Vazquez

## 2023-08-10 NOTE — ANESTHESIA POSTPROCEDURE EVALUATION
Post-Op Assessment Note    CV Status:  Stable  Pain Score: 0    Pain management: adequate     Mental Status:  Alert and awake   Hydration Status:  Euvolemic   PONV Controlled:  Controlled   Airway Patency:  Patent      Post Op Vitals Reviewed: Yes      Staff: CRNA         No notable events documented.     BP   106/50   Temp   98.7   Pulse  74   Resp   12   SpO2   100

## 2023-08-10 NOTE — OP NOTE
OPERATIVE REPORT  PATIENT NAME: Ying Nathan    :  1965  MRN: [id number]  Pt Location: [facility] OR ROOM 03    SURGERY DATE: 8/10/2023    Surgeon(s) and Role:     * Supa Coombs MD - Primary    Preop Diagnosis:  Postmenopausal vaginal bleeding [N95.0]    Post-Op Diagnosis Codes:     * Postmenopausal vaginal bleeding [N95.0]    Procedure(s):  DILATATION AND CURETTAGE (D&C) WITH HYSTEROSCOPY    Specimen(s):  ID Type Source Tests Collected by Time Destination   1 :  Tissue Endometrium TISSUE EXAM Supa Coombs MD 8/10/2023 12:59 PM        Estimated Blood Loss:   Minimal    Drains:  * No LDAs found *    Anesthesia Type:   General/LMA    Operative Indications:  Postmenopausal vaginal bleeding [N95.0]  56yo with hx of post menopausal bleeding found to have cervical polyp and thickened endometrium. EMB was scant and atrophic. . The results of her diagnostic testing, her differential diagnosis and treatment options, and the benefits and risks of these were reviewed. She has a good understanding and has agreed to proceed with diagnostic procedure    Operative Findings:   Exam under general anesthesia revealed normal external genitalia and a small uterus that sounded to approximately 8 cm. Significant cystocele with moderate apical prolapse. With hysteroscopy an atrophic endometrium was visualized without any abnormalities, with patent bilateral tubal ostia. Complications:   None    Procedure and Technique:  After obtaining informed consent and discussing the risks and benefits of the above procedure in the outpatient setting, the patient was met in the pre-operative holding area where she was properly identified by the surgical and anesthesia teams. The patient was taken to the operating room where anesthesia was obtained without difficulty. A patient safety time-out was performed with all members of the team present and in agreement. She was placed in dorsal lithotomy position using [place] stirrups.  The perineum and vagina were prepped with chlorhexadine solution. The patient was draped in the usual sterile fashion. Bladder was emptied for 200cc via straight cath. Attention was directed to the perineum where the cervix was easily visualized with Edgar and Fifield retractors and grasped at the anterior lip with a single-tooth tenaculum. The uterus was sounded as above. The cervix was then sequentially dilated to 21-Iraqi with Delcie Jamarcus dilators. The 12-degree hysteroscope with saline infusion was introduced through the cervix into the uterine cavity. A complete uterine survey was performed with the above findings noted. A curette was then used to curettage the uterine cavity in all 4 quadrants with a gritty texture noted. The pathologic specimen was sent off for pathologic review. The tenaculum was removed from the cervix. There was no active bleeding noted and all instruments were withdrawn from the uterus and vagina. All sponge, needle and instrument counts were correct x2. Anesthesia was reversed and the patient was taken to the PACU in a stable condition. I was present for the entire procedure.     Patient Disposition:  PACU     SIGNATURE: Abby Noriega MD  DATE: August 10, 2023  TIME: 1:15 PM

## 2023-08-10 NOTE — H&P
Assessment/Plan:         Problem List Items Addressed This Visit               Other     Postmenopausal vaginal bleeding - Primary       58yo with PMB presents for surgical intervention.     Previously discussed risks and benefits of the procedure and consents signed. Pt wishes to proceed with D+C for further diagnostic evaluation of her PMB.                Relevant Orders     Case request operating room: DILATATION AND CURETTAGE (D&C) WITH HYSTEROSCOPY (Completed)               CHIEF COMPLAINT: vaginal bleeding     Oncology Problem:   Cancer Staging   No matching staging information was found for the patient.     Previous therapy:      Oncology History     No history exists.         Most recent imaging:  US pelvis complete w transvaginal  Narrative: PELVIC ULTRASOUND, COMPLETE     INDICATION:  N95.0: Postmenopausal bleeding. The patient is 62years old.     COMPARISON: Pelvic ultrasound 2/28/2023     TECHNIQUE:   Transabdominal pelvic ultrasound was performed in sagittal and transverse planes with a curvilinear transducer. Additional transvaginal imaging was performed to better evaluate the endometrium and ovaries. Imaging included volumetric   sweeps as well as traditional still imaging technique.     FINDINGS:     UTERUS:  The uterus is anteverted in position, measuring 9.5 x 5.0 x 6.9 cm. Heterogeneous myometrium. 2.5 x 2.3 x 2.8 cm lower uterine segment fibroid, previously measured 3.5 x 2.8 x 2.3 cm. The cervix appears within normal limits.     ENDOMETRIUM:  The endometrial echo complex has an combined thickness of approximately 10-11 mm. Previously this measured about 20 mm. Tiny amount of endometrial fluid suggested. No focal pathology apparent.     OVARIES/ADNEXA:  Right ovary:  2.2 x 2.3 x 1.0 cm. 2.6 mL. Left ovary:  2.2 x 1.4 x 1.1 cm. 1.9 mL. Ovarian Doppler flow is within normal limits.   No suspicious ovarian or adnexal abnormality.     OTHER:  No free fluid or loculated fluid collections. Impression: Residual endometrial thickening albeit decreased from prior study. Tiny amount of endometrial fluid but no discrete pathology. Continued ultrasound surveillance would be prudent.     Heterogeneous uterus with small fibroid again noted.     Workstation performed: [id number]           Patient ID: Vannessa Regan is a 62 y.o. female  58yo with PMB presents for consultation. Pt was seen by PCP c/o PMB for a week in February. Ultrasound was obtained showing EMS 20mm. EMB obtained and noted to have cervical polyp which was removed. All pathology was negative. Pelvic US repeated for unclear reason showing EMS of 10mm. Menopause about 2 years ago. Reports maybe one other time of bleeding.           Review of Systems   Constitutional: Negative for appetite change, chills, fatigue and fever. Respiratory: Negative for chest tightness and shortness of breath. Gastrointestinal: Negative for abdominal distention, abdominal pain, constipation, diarrhea and nausea. Genitourinary: Negative for difficulty urinating, flank pain, frequency, urgency, vaginal bleeding, vaginal discharge and vaginal pain. Musculoskeletal: Negative for back pain, joint swelling and myalgias. Skin: Negative for rash. Neurological: Negative for dizziness, light-headedness, numbness and headaches.    Psychiatric/Behavioral: The patient is nervous/anxious.          Current Medications          Current Outpatient Medications   Medication Sig Dispense Refill   • calcium carbonate (TUMS) 500 mg chewable tablet Chew 1 tablet daily       • ibuprofen (MOTRIN) 200 mg tablet Take 200 mg by mouth as needed for mild pain       • pantoprazole (PROTONIX) 20 mg tablet Take 1 tablet (20 mg total) by mouth daily 60 tablet 0   • bisacodyl (DULCOLAX) 5 mg EC tablet Take 4 tablets (20 mg total) by mouth once for 1 dose 4 tablet 0   • polyethylene glycol (GOLYTELY) 4000 mL solution Take 4,000 mL by mouth once for 1 dose Please follow instructions as provided by office 4000 mL 0   • polyethylene glycol (GOLYTELY) 4000 mL solution Take 4,000 mL by mouth once for 1 dose 4000 mL 0      No current facility-administered medications for this visit.            No Known Allergies     Medical History   No past medical history on file.        Surgical History   No past surgical history on file.        OB History    No obstetric history on file.            Family History   No family history on file.        The following portions of the patient's history were reviewed and updated as appropriate: allergies, current medications, past family history, past medical history, past social history, past surgical history and problem list.        Objective:     Blood pressure 108/60, pulse 88, temperature (!) 97.3 °F (36.3 °C), temperature source Temporal, height 5' 2.5" (1.588 m), weight 87.1 kg (192 lb), SpO2 98 %. Body mass index is 34.56 kg/m².     Physical Exam  HENT:      Head: Normocephalic and atraumatic. Nose: Nose normal.   Cardiovascular:      Rate and Rhythm: Normal rate and regular rhythm. Pulmonary:      Effort: Pulmonary effort is normal.   Abdominal:      General: There is no distension. Palpations: Abdomen is soft. There is no mass. Genitourinary:     Comments: defer  Musculoskeletal:         General: No swelling. Normal range of motion. Cervical back: Normal range of motion. Skin:     General: Skin is warm and dry. Neurological:      General: No focal deficit present. Mental Status: She is alert.    Psychiatric:         Mood and Affect: Mood normal.               No results found for: ""        Lab Results   Component Value Date     HCT 41.4 02/17/2023     HGB 13.1 02/17/2023     MCV 90 02/17/2023      02/17/2023     WBC 4.72 02/17/2023            Lab Results   Component Value Date     ALKPHOS 83 02/17/2023     ALT 18 02/17/2023     AST 12 02/17/2023     BUN 14 02/17/2023     CALCIUM 8.8 02/17/2023      02/17/2023     CO2 28 02/17/2023     CREATININE 0.76 02/17/2023     EGFR 87 02/17/2023     GLUF 84 02/17/2023     K 3.7 02/17/2023          Trend:  No results found for: ""

## 2023-08-11 ENCOUNTER — TELEPHONE (OUTPATIENT)
Dept: GASTROENTEROLOGY | Facility: CLINIC | Age: 58
End: 2023-08-11

## 2023-08-11 NOTE — TELEPHONE ENCOUNTER
Pt was rescheduled from 9/19/2023 to 8/14/23. She is unable to come in on 8/14/23. However, does not want to reschedule at this time [facility].

## 2023-08-15 PROCEDURE — 88305 TISSUE EXAM BY PATHOLOGIST: CPT | Performed by: PATHOLOGY

## 2023-08-18 ENCOUNTER — TELEPHONE (OUTPATIENT)
Dept: GASTROENTEROLOGY | Facility: CLINIC | Age: 58
End: 2023-08-18

## 2023-08-18 NOTE — TELEPHONE ENCOUNTER
Called [place] and was advised to call [phone] to schedule and EGD and a OV.    ----- Message from [facility] FLORENTINO BHARDWAJ sent at 8/18/2023  7:23 AM EDT -----  Not an advanced procedure. Anyone can schedule. Thanks    ----- Message -----  From: Becky King  Sent: 8/17/2023   3:55 PM EDT  To:  [facility] FLORENTINO BHARDWAJ    When reviewing pathology from 6/14/23  Dr Courtney Garcia noted Please schedule EGD in 1 to 2 months for repeat dilation, order was placed, please review, thank you

## 2023-08-23 ENCOUNTER — PROCEDURE VISIT (OUTPATIENT)
Dept: OBGYN CLINIC | Facility: MEDICAL CENTER | Age: 58
End: 2023-08-23
Payer: COMMERCIAL

## 2023-08-23 VITALS
SYSTOLIC BLOOD PRESSURE: 115 MMHG | BODY MASS INDEX: 33.91 KG/M2 | DIASTOLIC BLOOD PRESSURE: 75 MMHG | HEIGHT: 63 IN | HEART RATE: 64 BPM | WEIGHT: 191.4 LBS

## 2023-08-23 DIAGNOSIS — M17.0 BILATERAL PRIMARY OSTEOARTHRITIS OF KNEE: Primary | ICD-10-CM

## 2023-08-23 PROCEDURE — 20610 DRAIN/INJ JOINT/BURSA W/O US: CPT | Performed by: PHYSICIAN ASSISTANT

## 2023-08-23 NOTE — PROGRESS NOTES
Ortho Sports Medicine Knee Visco Injection Visit     Assesment:   62 y.o. female bilateral knee OA    Plan:    Injection:  The risks and benefits of the injection (which include but are not limited to: infection, bleeding,damage to nerve/artery, need for further intervention), as well as the risks and benefits of all alternative treatments were explained and understood. The patient elected to proceed with injection. The procedure was done with aseptic technique, and the patient tolerated the procedure well with no complications. A viscosupplementation injection was performed. Ice to the knee 1-2 times daily for 20 minutes, for next 24-48 hrs. Follow up:  Return in about 6 weeks (around 10/4/2023) for Recheck. Chief Complaint   Patient presents with   • Left Knee - Follow-up   • Right Knee - Follow-up       History of Present Illness: The patient is returns for  Bilateral knee Durolane  visco supplementation injection. Since the prior visit, She reports no improvement. Patient denies fevers, chills, and sweats. Denies numbness and tingling. Denies chest pain, shortness of breath, calf pain, and warmth to the knee. I have reviewed the past medical, surgical, social and family history, medications and allergies as documented in the EMR. Review of systems: ROS is negative other than that noted in the HPI. Constitutional: Negative for fatigue and fever. Physical Exam:    Blood pressure 115/75, pulse 64, height 5' 2.5" (1.588 m), weight 86.8 kg (191 lb 6.4 oz).     General/Constitutional: NAD, well developed, well nourished  HENT: Normocephalic, atraumatic  CV: Intact distal pulses, regular rate  Resp: No respiratory distress or labored breathing  Lymphatic: No lymphadenopathy palpated  Neuro: Alert and Oriented x 3, no focal deficits  Psych: Normal mood, normal affect, normal judgement, normal behavior  Skin: Warm, dry, no rashes, no erythema    Large joint arthrocentesis: bilateral knee  Universal Protocol:  Consent given by: patient  Time out: Immediately prior to procedure a "time out" was called to verify the correct patient, procedure, equipment, support staff and site/side marked as required.   Timeout called at: 8/23/2023 2:04 PM.  Site marked: the operative site was marked  Patient identity confirmed: verbally with patient    Supporting Documentation  Indications: pain   Procedure Details  Location: knee - bilateral knee  Preparation: Patient was prepped and draped in the usual sterile fashion  Needle size: 22 G  Ultrasound guidance: no  Approach: anterolateral    Medications (Right): 3 mL sodium hyaluronate 60 MG/3MLMedications (Left): 3 mL sodium hyaluronate 60 MG/3ML   Patient tolerance: patient tolerated the procedure well with no immediate complications  Dressing:  Sterile dressing applied

## 2023-08-29 ENCOUNTER — OFFICE VISIT (OUTPATIENT)
Dept: GYNECOLOGIC ONCOLOGY | Facility: CLINIC | Age: 58
End: 2023-08-29

## 2023-08-29 VITALS
BODY MASS INDEX: 34.02 KG/M2 | HEART RATE: 73 BPM | DIASTOLIC BLOOD PRESSURE: 82 MMHG | SYSTOLIC BLOOD PRESSURE: 132 MMHG | OXYGEN SATURATION: 99 % | WEIGHT: 189 LBS | TEMPERATURE: 97.5 F

## 2023-08-29 DIAGNOSIS — N95.0 POSTMENOPAUSAL VAGINAL BLEEDING: Primary | ICD-10-CM

## 2023-08-29 PROCEDURE — 99024 POSTOP FOLLOW-UP VISIT: CPT | Performed by: OBSTETRICS & GYNECOLOGY

## 2023-08-29 NOTE — ASSESSMENT & PLAN NOTE
56yo with PMB status post D&C presents for postop. Pathology consistent with benign atrophic endometrium. Discussed that the likely cause of her bleeding with either the cervical polyp and or vaginal atrophy. No further bleeding. D/w pt no further work up is necessary at this time. Should she have repeat bleeding she can f/u with gyn.

## 2023-08-29 NOTE — PROGRESS NOTES
Assessment/Plan:    Problem List Items Addressed This Visit        Other    Postmenopausal vaginal bleeding - Primary     58yo with PMB status post D&C presents for postop. Pathology consistent with benign atrophic endometrium. Discussed that the likely cause of her bleeding with either the cervical polyp and or vaginal atrophy. No further bleeding. D/w pt no further work up is necessary at this time. Should she have repeat bleeding she can f/u with gyn. CHIEF COMPLAINT: post op        Problem:   Cancer Staging   No matching staging information was found for the patient. Previous therapy:  Oncology History    No history exists. Patient ID: Scot Sheffield is a 62 y.o. female  58yo with PMB status post D&C presents for postop. Recovering well post operative. Denies vaginal bleeding/discharge. Normal BM/urination. No pelvic pain/abdominal pain. Appetite adequate. The following portions of the patient's history were reviewed and updated as appropriate: allergies, current medications, past family history, past medical history, past social history, past surgical history and problem list.    Review of Systems   Constitutional: Negative. HENT: Negative. Eyes: Negative. Respiratory: Negative. Cardiovascular: Negative. Gastrointestinal: Negative. Endocrine: Negative. Genitourinary: Negative. Musculoskeletal: Negative. Neurological: Negative. Psychiatric/Behavioral: Negative. Current Outpatient Medications:   •  ibuprofen (MOTRIN) 200 mg tablet, Take 200 mg by mouth as needed for mild pain, Disp: , Rfl:   •  pantoprazole (PROTONIX) 40 mg tablet, Take 1 tablet (40 mg total) by mouth daily, Disp: 30 tablet, Rfl: 2    Objective:    Blood pressure 132/82, pulse 73, temperature 97.5 °F (36.4 °C), temperature source Temporal, weight 85.7 kg (189 lb), SpO2 99 %. Body mass index is 34.02 kg/m². Body surface area is 1.88 meters squared.     Physical Exam  HENT:      Head: Normocephalic and atraumatic. Nose: Nose normal.   Cardiovascular:      Rate and Rhythm: Normal rate and regular rhythm. Pulmonary:      Effort: Pulmonary effort is normal.   Abdominal:      General: There is no distension. Palpations: Abdomen is soft. There is no mass. Genitourinary:     Comments: defer  Musculoskeletal:         General: No swelling. Normal range of motion. Cervical back: Normal range of motion. Skin:     General: Skin is warm and dry. Neurological:      General: No focal deficit present. Mental Status: She is alert.    Psychiatric:         Mood and Affect: Mood normal.

## 2023-09-05 ENCOUNTER — TELEPHONE (OUTPATIENT)
Dept: OBGYN CLINIC | Facility: MEDICAL CENTER | Age: 58
End: 2023-09-05

## 2023-09-05 NOTE — TELEPHONE ENCOUNTER
JAMES stating that her follow up apt for her knees on 9/18 was scheduled incorrectly with the wrong provider- that I was switching her back to her provider, Dr. Mague Ramírez for the same time and location.

## 2023-09-06 DIAGNOSIS — Z12.31 ENCOUNTER FOR SCREENING MAMMOGRAM FOR BREAST CANCER: Primary | ICD-10-CM

## 2023-10-19 ENCOUNTER — OFFICE VISIT (OUTPATIENT)
Dept: FAMILY MEDICINE CLINIC | Facility: CLINIC | Age: 58
End: 2023-10-19

## 2023-10-19 VITALS
BODY MASS INDEX: 33.63 KG/M2 | WEIGHT: 189.8 LBS | HEIGHT: 63 IN | OXYGEN SATURATION: 100 % | HEART RATE: 58 BPM | RESPIRATION RATE: 18 BRPM | SYSTOLIC BLOOD PRESSURE: 115 MMHG | DIASTOLIC BLOOD PRESSURE: 75 MMHG | TEMPERATURE: 97.6 F

## 2023-10-19 DIAGNOSIS — M25.512 ACUTE PAIN OF LEFT SHOULDER: Primary | ICD-10-CM

## 2023-10-19 RX ORDER — ACETAMINOPHEN 325 MG/1
975 TABLET ORAL EVERY 8 HOURS
Qty: 180 TABLET | Refills: 1 | Status: SHIPPED | OUTPATIENT
Start: 2023-10-19

## 2023-10-19 RX ORDER — LIDOCAINE 50 MG/G
1 PATCH TOPICAL DAILY
Qty: 30 PATCH | Refills: 1 | Status: SHIPPED | OUTPATIENT
Start: 2023-10-19

## 2023-10-19 RX ORDER — IBUPROFEN 400 MG/1
400 TABLET ORAL EVERY 6 HOURS PRN
Qty: 90 TABLET | Refills: 1 | Status: SHIPPED | OUTPATIENT
Start: 2023-10-19

## 2023-10-19 NOTE — PROGRESS NOTES
Name: Carlos Raymond      : 1965      MRN: [id number]  Encounter Provider: Ramona Echevarria DO  Encounter Date: 10/19/2023   Encounter department: [facility]    Assessment & Plan       Acute pain of left shoulder  Pt having L shoulder and arm pain that sometimes radiates to forearm x2 months as well as LUE paresthesia in some mornings. Notes fhx of arthritic pain in 2 sisters. Prescribed tylenol 975mg q8 hrs and ibuprofen 400mg q6 hrs prn. Ordered physical therapy referral. Discussed no need for XR right now considering normal physical exam.   F/u with PCP in 4 weeks. F/u sooner if symptoms worsen or does not improve. Subjective      61 y/o female pt presents c/o intermittent 6/10 left shoulder pain that radiates to left arm and forearm x2 months. Sometimes she will get L temporal headaches with this. Denies trauma head, neck, and left upper extremity. She sometimes takes motrin for that pain but without much relief. Notices decrease in pain when she is resting the weight of her arm on the countertop. Pt states she is having difficulty performing daily life tasks like brushing her hair or putting on a shirt due to sharp pain in the L upper forearm. Pt reports that some mornings she wakes up with numbness and tingling on left upper extremity. Denies sleeping on the left side. Pt states there is Fhx of arthritic pain. Notes that one sister who had similar pain improved after physical therapy and another sister with arthritic pain received surgery. Arm Pain     Sinus Problem      Review of Systems   Constitutional:  Negative for fever. HENT:  Negative for voice change. Eyes:  Negative for photophobia and visual disturbance. Gastrointestinal:  Negative for abdominal distention and abdominal pain. Endocrine: Negative for polydipsia and polyphagia. Musculoskeletal:  Positive for back pain (chronic upper).         Left shoulder and arm pain Neurological:  Negative for dizziness, syncope, weakness and light-headedness. Current Outpatient Medications on File Prior to Visit   Medication Sig    [DISCONTINUED] ibuprofen (MOTRIN) 200 mg tablet Take 200 mg by mouth as needed for mild pain    pantoprazole (PROTONIX) 40 mg tablet Take 1 tablet (40 mg total) by mouth daily       Objective     /75 (BP Location: Right arm, Patient Position: Sitting, Cuff Size: Large)   Pulse 58   Temp 97.6 °F (36.4 °C) (Temporal)   Resp 18   Ht 5' 2.5" (1.588 m)   Wt 86.1 kg (189 lb 12.8 oz)   SpO2 100%   BMI 34.16 kg/m²     Physical Exam  Vitals reviewed. Constitutional:       General: She is not in acute distress. Appearance: She is obese. She is not ill-appearing. HENT:      Head: Normocephalic and atraumatic. Nose: No congestion or rhinorrhea. Eyes:      Conjunctiva/sclera: Conjunctivae normal.   Cardiovascular:      Rate and Rhythm: Normal rate and regular rhythm. Pulses: Normal pulses. Heart sounds: Normal heart sounds. Pulmonary:      Effort: Pulmonary effort is normal.      Breath sounds: Normal breath sounds. Abdominal:      General: There is no distension. Palpations: Abdomen is soft. Tenderness: There is no abdominal tenderness. There is no right CVA tenderness, left CVA tenderness or guarding. Musculoskeletal:         General: No swelling, tenderness, deformity or signs of injury. Normal range of motion. Cervical back: Normal range of motion. No tenderness. Right lower leg: No edema. Left lower leg: No edema. Comments: LUE strength normal. Full ROM but painful. No clicking or popping. Skin:     General: Skin is warm and dry. Neurological:      Mental Status: She is alert and oriented to person, place, and time. Cranial Nerves: No cranial nerve deficit. Sensory: No sensory deficit. Motor: No weakness.       Gait: Gait normal.       Rachel Delatorre DO

## 2023-10-19 NOTE — PROGRESS NOTES
Name: Sonia Mckee      : 1965      MRN: [id number]  Encounter Provider: Anel Chahal DO  Encounter Date: 10/19/2023   Encounter department: [facility]    Assessment & Plan       Acute pain of left shoulder  Pt having L shoulder and arm pain that sometimes radiates to forearm x2 months as well as LUE paresthesia in some mornings. Notes fhx of arthritic pain in 2 sisters. Prescribed tylenol 975mg q8 hrs and ibuprofen 400mg q6 hrs prn. Ordered physical therapy referral. Discussed no need for XR right now considering normal physical exam.   F/u with PCP in 4 weeks. F/u sooner if symptoms worsen or does not improve. Subjective      61 y/o female pt presents c/o intermittent 6/10 left shoulder pain that radiates to left arm and forearm x2 months. Sometimes she will get L temporal headaches with this. Denies trauma head, neck, and left upper extremity. She sometimes takes motrin for that pain but without much relief. Notices decrease in pain when she is resting the weight of her arm on the countertop. Pt states she is having difficulty performing daily life tasks like brushing her hair or putting on a shirt due to sharp pain in the L upper forearm. Pt reports that some mornings she wakes up with numbness and tingling on left upper extremity. Denies sleeping on the left side. Pt states there is Fhx of arthritic pain. Notes that one sister who had similar pain improved after physical therapy and another sister with arthritic pain received surgery. Review of Systems   Constitutional:  Negative for fever. HENT:  Negative for voice change. Eyes:  Negative for photophobia and visual disturbance. Gastrointestinal:  Negative for abdominal distention and abdominal pain. Endocrine: Negative for polydipsia and polyphagia. Musculoskeletal:  Positive for back pain (chronic upper).         Left shoulder and arm pain   Neurological:  Negative for dizziness, syncope, weakness and light-headedness. Current Outpatient Medications on File Prior to Visit   Medication Sig    [DISCONTINUED] ibuprofen (MOTRIN) 200 mg tablet Take 200 mg by mouth as needed for mild pain    pantoprazole (PROTONIX) 40 mg tablet Take 1 tablet (40 mg total) by mouth daily       Objective     /75 (BP Location: Right arm, Patient Position: Sitting, Cuff Size: Large)   Pulse 58   Temp 97.6 °F (36.4 °C) (Temporal)   Resp 18   Ht 5' 2.5" (1.588 m)   Wt 86.1 kg (189 lb 12.8 oz)   SpO2 100%   BMI 34.16 kg/m²     Physical Exam  Vitals reviewed. Constitutional:       General: She is not in acute distress. Appearance: She is obese. She is not ill-appearing. HENT:      Head: Normocephalic and atraumatic. Nose: No congestion or rhinorrhea. Eyes:      Conjunctiva/sclera: Conjunctivae normal.   Cardiovascular:      Rate and Rhythm: Normal rate and regular rhythm. Pulses: Normal pulses. Heart sounds: Normal heart sounds. Pulmonary:      Effort: Pulmonary effort is normal.      Breath sounds: Normal breath sounds. Abdominal:      General: There is no distension. Palpations: Abdomen is soft. Tenderness: There is no abdominal tenderness. There is no right CVA tenderness, left CVA tenderness or guarding. Musculoskeletal:         General: No swelling, tenderness, deformity or signs of injury. Normal range of motion. Cervical back: Normal range of motion. No tenderness. Right lower leg: No edema. Left lower leg: No edema. Comments: LUE strength normal. Full ROM but painful. No clicking or popping. Skin:     General: Skin is warm and dry. Neurological:      Mental Status: She is alert and oriented to person, place, and time. Cranial Nerves: No cranial nerve deficit. Sensory: No sensory deficit. Motor: No weakness.       Gait: Gait normal.       Lin Mata DO

## 2023-10-19 NOTE — ASSESSMENT & PLAN NOTE
Pt having L shoulder and arm pain that sometimes radiates to forearm x2 months as well as LUE paresthesia in some mornings. Notes fhx of arthritic pain in 2 sisters. Prescribed tylenol 975mg q8 hrs and ibuprofen 400mg q6 hrs prn. Ordered physical therapy referral. Discussed no need for XR right now considering normal physical exam.   F/u with PCP in 4 weeks. F/u sooner if symptoms worsen or does not improve.

## 2023-11-27 ENCOUNTER — TELEPHONE (OUTPATIENT)
Dept: FAMILY MEDICINE CLINIC | Facility: CLINIC | Age: 58
End: 2023-11-27

## 2023-11-27 NOTE — TELEPHONE ENCOUNTER
Patient called and LVM on clinical line asking if she had any upcoming appointments. Tried calling patient back she didn't answer and I could not leave a voicemail the inbox was full. Will try to call back again.

## 2023-11-29 ENCOUNTER — EVALUATION (OUTPATIENT)
Dept: PHYSICAL THERAPY | Facility: REHABILITATION | Age: 58
End: 2023-11-29
Payer: MEDICARE

## 2023-11-29 DIAGNOSIS — M25.512 ACUTE PAIN OF LEFT SHOULDER: Primary | ICD-10-CM

## 2023-11-29 PROCEDURE — 97110 THERAPEUTIC EXERCISES: CPT | Performed by: PHYSICAL THERAPIST

## 2023-11-29 PROCEDURE — 97161 PT EVAL LOW COMPLEX 20 MIN: CPT | Performed by: PHYSICAL THERAPIST

## 2023-11-29 NOTE — PROGRESS NOTES
PT Evaluation     Today's date: 2023  Patient name: Luz Delarosa  : 1965  MRN: [id number]  Referring provider: Cecil Levine DO  Dx:   Encounter Diagnosis     ICD-10-CM    1. Acute pain of left shoulder  M25.512 Ambulatory Referral to Physical Therapy          Start Time: 1015  Stop Time: 1057  Total time in clinic (min): 42 minutes    Assessment  Assessment details: Luz Delarosa is a 62y.o. year old female who presents to IE with acute pain of left shoulder. Patient presents with signs and symptoms consistent with possible adhesive capsulitis. She presents with limitations in active and passive range of motion limited in ER, ABD and FLX. Mild strength deficits noted but likely due to pain and range of motion limitations. Maria Esther Georges presents with the impairments as listed above and would benefit from Physical Therapy to address these impairments, improved quality of life and to maximize function. Impairments: abnormal or restricted ROM, activity intolerance, impaired physical strength, lacks appropriate home exercise program, pain with function and poor posture   Understanding of Dx/Px/POC: good   Prognosis: good    Goals  ST. Patient will report 50% decrease in L shoulder pain. 2. Patient will demonstrate 25% improvement in L shoulder ROM in 6 visits. LT. Patient will be independent with HEP in 12 visits. 2. Patient will report no pain while sleeping at night to improve rest and overall quality of life in 12 visits. 3. Patient will demonstrate L shoulder ROM near normal and comparable to contralateral.      Plan  Plan details: Thank you for opportunity to participate in the care of Luz Delarosa.     Patient would benefit from: PT eval and skilled physical therapy  Planned modality interventions: cryotherapy, unattended electrical stimulation and TENS  Planned therapy interventions: activity modification, flexibility, home exercise program, therapeutic exercise, therapeutic activities, stretching, strengthening, postural training, patient education, neuromuscular re-education, manual therapy and joint mobilization  Frequency: 2x week  Duration in visits: 12  Duration in weeks: 6  Plan of Care beginning date: 2023  Plan of Care expiration date: 1/10/2024  Treatment plan discussed with: patient      Subjective Evaluation    History of Present Illness  Mechanism of injury: Patient is a 62 y.o. presenting to physical therapy with complaints of left shoulder/arm pain over the last three months. She reports it hurts when she sleeps and has difficulty with washing/brushing her hair, shaving, putting on a shirt and swimming. She reports numbness/tingling in both hands but mostly the left. She also notices left arm swelling randomly. She denies neck pain. N/T/Radicular pain: she can get N/T in both hands L>R, can get a painful needle like feelings down the arm with movement     Patient Goals  Patient goals for therapy: decreased pain, increased motion, increased strength and independence with ADLs/IADLs  Patient goal: use arm normally  Pain  Current pain ratin  At worst pain ratin  Location: left shoulder and down left arm  Quality: needle-like and dull ache  Relieving factors: rest and support (resting arm on the table)  Aggravating factors: overhead activity (dressing, brushing hair, swimming)    Social Support  Lives with: adult children (41 yo son)    Employment status: not working  Hand dominance: right    Treatments  Current treatment: physical therapy        Objective     Tenderness     Left Shoulder   Tenderness in the [facility] joint. No tenderness in the acromion, biceps tendon (proximal) and supraspinatus tendon.      Active Range of Motion   Cervical/Thoracic Spine       Cervical    Flexion: 80 degrees   Extension: 40 degrees      Left lateral flexion: 40 degrees     with pain  Right lateral flexion: 35 degrees      Left Shoulder   Flexion: 110 degrees with pain  Abduction: 95 degrees with pain  External rotation 45°: 27 degrees with pain  External rotation BTH: C4     Right Shoulder   Normal active range of motion  External rotation BTH: C4   Internal rotation BTB: sacrum     Additional Active Range of Motion Details  Seated AROM screen:  Flexion - 50% pain and weakness  Abduction - 50% pain and weakness    Passive Range of Motion   Left Shoulder   Flexion: 110 degrees with pain  Abduction: 95 degrees with pain  External rotation 45°: 27 degrees with pain  Internal rotation 0°: 35 degrees     Strength/Myotome Testing     Left Shoulder     Planes of Motion   Flexion: 4-   Adduction: 4   External rotation at 0°: 4   Internal rotation at 0°: 4     Right Shoulder     Planes of Motion   Flexion: 3+   Adduction: 3+   External rotation at 0°: 4   Internal rotation at 0°: 4     Left Elbow   Flexion: 4  Extension: 4    Right Elbow   Flexion: 4  Extension: 4    Left Wrist/Hand   Thumb extension: 3+    Right Wrist/Hand   Thumb extension: 3+    Tests   Cervical   Negative vertical compression. Left   Negative Spurling's Test A. Right   Negative Spurling's Test A. Lumbar   Negative vertical compression.                     Precautions: n/a    Daily Treatment Diary:    Manuals 11/29/2023           L Shoulder PROM nv           GH joint mobs  nv                       Neuromuscular Re-education            Pulleys flx/scap nv           Scap retractions                                                Cane ER 10x5"           Cane flexion 10x5"           Tables slides (flx/scap) 10x5" ea           Wedge ER st nv           Sleeper st nv                                   Therapeutic Exercise            UBE                         TB low rows             TB mid rows            TB ER/IR            TB bilateral ER w retraction            TB horizontal abduction                                    Prone I’s            Prone T’s            Prone Y’s            SL ER            SL abduction            SL flexion            Therapeutic Activity            Wall Ball rolls            Standing Scaption                                    * = on HEP

## 2023-12-06 ENCOUNTER — OFFICE VISIT (OUTPATIENT)
Dept: PHYSICAL THERAPY | Facility: REHABILITATION | Age: 58
End: 2023-12-06
Payer: MEDICARE

## 2023-12-06 DIAGNOSIS — M25.512 ACUTE PAIN OF LEFT SHOULDER: Primary | ICD-10-CM

## 2023-12-06 PROCEDURE — 97112 NEUROMUSCULAR REEDUCATION: CPT | Performed by: PHYSICAL THERAPY ASSISTANT

## 2023-12-06 PROCEDURE — 97110 THERAPEUTIC EXERCISES: CPT | Performed by: PHYSICAL THERAPY ASSISTANT

## 2023-12-06 PROCEDURE — 97140 MANUAL THERAPY 1/> REGIONS: CPT | Performed by: PHYSICAL THERAPY ASSISTANT

## 2023-12-06 NOTE — PROGRESS NOTES
Daily Note     Today's date: 2023  Patient name: Scot Sheffield  : 1965  MRN: [id number]  Referring provider: Rachel Delatorre DO  Dx:   Encounter Diagnosis     ICD-10-CM    1. Acute pain of left shoulder  M25.512                      Subjective: Pt reports she continues to have pain with all motions, especially ABD. Objective: See treatment diary below      Assessment: Tolerated treatment fair. Progressed TE as noted with fair tolerance. Pt reports moderate pain with activity. Patient demonstrated fatigue post treatment, exhibited good technique with therapeutic exercises, and would benefit from continued PT      Plan: Continue per plan of care. Progress treatment as tolerated.              Precautions: n/a    Daily Treatment Diary:    Manuals 2023          L Shoulder PROM nv RK          GH joint mobs  nv LR Inf/AP mobs                      Neuromuscular Re-education            Pulleys flx/scap nv 10"x15          Scap retractions  5"x15                                              Cane ER 10x5" 5"x15          Cane flexion 10x5" 5"x15          Tables slides (flx/scap) 10x5" ea 5"x10           Wedge ER st nv 5"x1s0          Sleeper st nv At wall  10"x10                                  Therapeutic Exercise            UBE                         TB low rows   OTB  x15          TB mid rows  OTB x15          TB ER/IR            TB bilateral ER w retraction            TB horizontal abduction                                    Prone I’s            Prone T’s            Prone Y’s            SL ER            SL abduction            SL flexion            Therapeutic Activity            Wall Ball rolls            Standing Scaption                                    * = on HEP

## 2023-12-08 ENCOUNTER — OFFICE VISIT (OUTPATIENT)
Dept: PHYSICAL THERAPY | Facility: REHABILITATION | Age: 58
End: 2023-12-08
Payer: MEDICARE

## 2023-12-08 DIAGNOSIS — M25.512 ACUTE PAIN OF LEFT SHOULDER: Primary | ICD-10-CM

## 2023-12-08 PROCEDURE — 97140 MANUAL THERAPY 1/> REGIONS: CPT

## 2023-12-08 PROCEDURE — 97112 NEUROMUSCULAR REEDUCATION: CPT

## 2023-12-08 PROCEDURE — 97110 THERAPEUTIC EXERCISES: CPT

## 2023-12-08 NOTE — PROGRESS NOTES
Daily Note     Today's date: 2023  Patient name: Belkis Ngo  : 1965  MRN: [id number]  Referring provider: Renée Calvo DO  Dx:   Encounter Diagnosis     ICD-10-CM    1. Acute pain of left shoulder  M25.512           Start Time: 1020  Stop Time: 1108  Total time in clinic (min): 48 minutes    Subjective: Patient reporting shoulder soreness at start of session stating "it hurts but then exercises make it feel better than it gets sore and hurts again."       Objective: See treatment diary below      Assessment: Tolerated treatment well. Patient demonstrated fatigue post treatment, exhibited good technique with therapeutic exercises, and would benefit from continued PT Limited in all planes with shoulder PROM, good tolerance to all TE performed with patient reporting some shoulder relief at end of session. Plan:  Patient reports she will call us next week if she would like to continue with physical therapy. Patient given updated HEP today reporting understanding. Will continue to follow up with patient as appropriate.      Precautions: n/a    Daily Treatment Diary:    Manuals 2023         L Shoulder PROM nv RK Done          GH joint mobs  nv LR Inf/AP mobs                      Neuromuscular Re-education            Pulleys flx/scap nv 10"x15 3' ea         Scap retractions  5"x15 3x10x5''                                             Cane ER 10x5" 5"x15 2x10x5''         Cane flexion 10x5" 5"x15 2x10x5''         Tables slides (flx/scap) 10x5" ea 5"x10  2x10x5'' ea         Wedge ER st nv 5"x10s 10x10''          Sleeper st nv At wall  10"x10 nv                                 Therapeutic Exercise            UBE                         TB low rows   OTB  x15 OTB 2x10         TB mid rows  OTB x15 Bendon 2x10         TB ER/IR            TB bilateral ER w retraction            TB horizontal abduction                                    Prone I’s            Prone T’s            Prone Y’s SL ER            SL abduction            SL flexion            Therapeutic Activity            Wall Ball rolls            Standing Scaption                                    * = on HEP

## 2024-03-04 ENCOUNTER — TELEPHONE (OUTPATIENT)
Dept: HEMATOLOGY ONCOLOGY | Facility: CLINIC | Age: 59
End: 2024-03-04

## 2024-03-04 NOTE — TELEPHONE ENCOUNTER
Return call placed explained that a D&C is a sampling of the uterine lining and nothing was done to the vagina/.  She will f/u with benign gynecologist

## 2024-03-04 NOTE — TELEPHONE ENCOUNTER
Patient Call    Who are you speaking with? Patient    If it is not the patient, are they listed on an active communication consent form? N/A   What is the reason for this call? Pt had a D&C done in August, pt states that inside her vagina one part is white. Pt denies any pain or discharge. Pt is asking if this is normal or is this something she should be concerned about? Pt is asking for a call back regarding this.   Does this require a call back? Yes   If a call back is required, please list [facility] call back number [phone]   If a call back is required, advise that a message will be forwarded to their care team and someone will return their call as soon as possible.   Did you relay this information to the patient? Yes

## 2024-03-13 ENCOUNTER — OFFICE VISIT (OUTPATIENT)
Dept: OBGYN CLINIC | Facility: CLINIC | Age: 59
End: 2024-03-13
Payer: MEDICARE

## 2024-03-13 VITALS
WEIGHT: 188.6 LBS | SYSTOLIC BLOOD PRESSURE: 114 MMHG | DIASTOLIC BLOOD PRESSURE: 72 MMHG | BODY MASS INDEX: 33.42 KG/M2 | HEIGHT: 63 IN

## 2024-03-13 DIAGNOSIS — N95.2 VAGINAL ATROPHY: ICD-10-CM

## 2024-03-13 DIAGNOSIS — N90.4 VULVAR DYSTROPHY: Primary | ICD-10-CM

## 2024-03-13 PROCEDURE — 99203 OFFICE O/P NEW LOW 30 MIN: CPT | Performed by: OBSTETRICS & GYNECOLOGY

## 2024-03-13 NOTE — PROGRESS NOTES
Assessment/Plan:  Reviewed physical findings, suspect lichen sclerosis.  Implications reviewed.  At this point we will recommend hydrocortisone cream nightly x 4 weeks.  She will return to office in 5 to 6 weeks for follow-up as well as possible vulvar biopsy with pending results.  All questions answered.  No problem-specific Assessment & Plan notes found for this encounter.       Diagnoses and all orders for this visit:    Vulvar dystrophy    Vaginal atrophy          Subjective:      Patient ID: Belkis Cunningham is a 58 y.o. female.    HPI    This is a pleasant 58-year-old female P2 ( x 1,  x 1) presents as a new patient complaining of vulvar itching, irritation extending to the perianal region over the last 3 weeks.  She went through menopause at age 53.  She is never been on hormone replacement therapy.  She denies any vaginal bleeding or spotting.  No vaginal discharge.  No hot flashes or night sweats.  Pap smears have been normal.  Her last GYN exam was approximately a year ago, done by her primary care physician.  She also underwent D&C hysteroscopy for episode of postmenopausal bleeding revealing benign pathology.    She was using Preparation H with minimal improvement.    The following portions of the patient's history were reviewed and updated as appropriate: allergies, current medications, past family history, past medical history, past social history, past surgical history, and problem list.    Review of Systems   Constitutional:  Negative for fatigue, fever and unexpected weight change.   Respiratory:  Negative for cough, chest tightness, shortness of breath and wheezing.    Cardiovascular: Negative.  Negative for chest pain and palpitations.   Gastrointestinal: Negative.  Negative for abdominal distention, abdominal pain, blood in stool, constipation, diarrhea, nausea and vomiting.   Genitourinary:  Positive for vaginal pain. Negative for difficulty urinating, dyspareunia, dysuria, flank pain,  "frequency, genital sores, hematuria, pelvic pain, urgency, vaginal bleeding and vaginal discharge.   Skin:  Negative for rash.         Objective:      /72   Ht 5' 3\" (1.6 m)   Wt 85.5 kg (188 lb 9.6 oz)   BMI 33.41 kg/m²          Physical Exam  Constitutional:       Appearance: Normal appearance.   Cardiovascular:      Rate and Rhythm: Normal rate and regular rhythm.   Pulmonary:      Effort: Pulmonary effort is normal.   Abdominal:      General: Bowel sounds are normal. There is no distension.      Palpations: Abdomen is soft.      Tenderness: There is no abdominal tenderness. There is no guarding or rebound.   Genitourinary:     Labia:         Right: No rash, tenderness or lesion.         Left: No rash, tenderness or lesion.       Vagina: No signs of injury. No vaginal discharge, tenderness or lesions.      Cervix: No cervical motion tenderness, discharge, friability or lesion.      Uterus: Not enlarged and not tender.       Adnexa:         Right: No mass or tenderness.          Left: No mass or tenderness.     Neurological:      Mental Status: She is alert.   Psychiatric:         Behavior: Behavior normal.         External genitalia is evident of pallor thinning noted along perineum extending perianal, suspicious for lichen sclerosis.  The vagina is evident of estrogen deficiency.  There is no vaginal discharge.   "

## 2024-04-23 NOTE — ASSESSMENT & PLAN NOTE
OUTPATIENT PROGRESS NOTE    DATE OF SERVICE:  04/23/24      PROBLEM LIST:  Low grade B-cell lymphoma diagnosed on bone marrow biopsy performed 12/16/16.   Normocytic normochromic anemia since at least 08/2015.   Low folic acid level noted on 08/18/2016.     Hypertension.  Antiphospholipid antibody syndrome noted on workup performed for right lower extremity deep venous thrombosis in the year 2000, with subsequent placement on indefinite anticoagulation.  GERD.  Osteoarthritis.  Vitamin D deficiency.  Total abdominal hysterectomy plus unilateral salpingo-oophorectomy for fibroid uterus.  Appendectomy.  CT CAP from 6/22/17 shows enlarging upper abdominal lymphadenopathy, enlarging splenomegaly, no evidence for hilar, mediastinal, axillary, or supraclavicular adenopathy  Pt started on Rituxan 375 mg/m2 IV q week x 4 on 06/30/17. Pt had developed a reaction to Rituxan with initial infusion, subsequently given Rituxan with Decadron 20 mg po to be taken the morning of Rituxan infusion.  CT CAP from 8/28/17 showed resolution to splenomegaly and no noticed adenopathy. Patient subsequently placed on surveillance.   CT from 3/1/2018 showed BANG. Should her lymphoma return, will consider re-starting Rituxan. CT CAP from 9/10/18 showed no evidence of residual or recurrent adenopathy. CT CAP 6/4/19 shows stable pulmonary nodule in LLL.   CT CAP on 7/16/2020 showed no evidence of recurrent lymphoma or other significant abnormalities.  CT CAP on 7/16/2022showed no evidence of recurrent lymphoma or other significant abnormalities.     CHIEF COMPLAINT:  F/u for low grade B-cell lymphoma.    SYMPTOMS:  Patient presents to the clinic with their  for follow up. Patient is good. She reports a little cough which she relates to seasonal allergies. She is not taking anything for her allergies. She reports easy bruising which she relates to her Coumadin. She says she developed a lump on her back this week but notes it is not painful.  Patient with post-menopausal bleeding presented for endometrial biopsy  Risks and benefits reviewed  Patient consented  No bleeding since initial encounter on 2/10/23  TVUS 2/28/23: 20 mm endometrial stripe  -Polyp on cervical os; polypectomy performed = tissue sent for pathology  -Endometrial biopsy = tissue sent for pathology   -Pap smear performed = tissue sent for pathology  · F/U with patient once pathology results  · Consider Gyn/Onc referral depending on results  · Ibuprofen prn   · Recommended to contact clinic/go to ED if significant bleeding, cramping, or pain post procedure  She also reports a similar lump on her left arm.  She reports ongoing occasional palpitations. She says they only last a couple seconds and notes no associated lightheadedness or chest pains. She cannot identify any trigger. She says this has been ongoing for a long time. She reports no other areas of concern. Her appetite is good and weight is stable. Denies nightsweats, abdominal pain.    REVIEW OF SYSTEMS:    Detailed 10 point review of systems was performed which was negative other than the pertinent positives as discussed above.    ECOG PS= 1.    ALLERGIES:  No Known Allergies      Current Outpatient Medications   Medication Sig Dispense Refill    losartan-hydrochlorothiazide (HYZAAR) 100-12.5 MG per tablet Take 1 tablet by mouth daily. 90 tablet 3    warfarin (COUMADIN) 5 MG tablet 1/2 TABLET (2.5 mg) BY MOUTH ON MONDAYS AND WEDNESDAY AND 1 TABLET (5 mg) THE REST OF THE WEEK OR AS DIRECTED BY THE COUMADIN CLINIC 90 tablet 0    amoxicillin (AMOXIL) 500 MG capsule TAKE FOUR CAPSULES BY MOUTH 1 HOUR BEFORE DENTAL APPOINTMENT (Patient not taking: Reported on 4/23/2024) 16 capsule 0    loratadine (CLARITIN) 10 MG tablet Take 1 tablet by mouth daily. (Patient taking differently: Take 10 mg by mouth as needed.) 30 tablet 5    Prenatal Vit-Fe Fumarate-FA (PRENATAL VITAMIN PO) Take 1 tablet by mouth.       No current facility-administered medications for this visit.     PHYSICAL EXAM:  GENERAL: Patient is sitting up in chair in no apparent distress.  Vitals:    04/23/24 0922   BP: (!) 144/68   BP Location: RUE - Right upper extremity   Patient Position: Sitting   Cuff Size: Large Adult   Pulse: 82   Resp: 16   Temp: 97.8 °F (36.6 °C)   TempSrc: Oral   SpO2: 98%   Weight: 79.6 kg (175 lb 9.5 oz)   Height: 5' 2\" (1.575 m)   PainSc:  0      EYES: QUAN, EOMI. Conjunctival pallor is not present. Sclerae: No icterus.  ENT: Minimal hearing loss. Tongue: No masses. Uvula in midline. Tonsils: Unremarkable.   NECK: No  thyromegaly.   LYMPHATIC: No lymphadenopathy in neck, axillary or inguinal areas.   PULMONARY: Clear vesicular breath sounds, no wheezing or crackles. No dullness on percussion.   CARDIOVASCULAR: S1, S2 heard, regular rate and rhythm. No mechanical sounds or murmurs noted. Apical pulse normal.   ABDOMEN: Soft. No masses are palpable. No liver/spleen palpable. BSs- 2 to 3 per minute. No ascites noted.    EXTREMITIES: Bilateral lower extremities no edema is noted.   SKIN: No ulceration. No unusual masses. No unusual pigmentation. Just to the right of the midline around T12, there is a 3 cm x 2 cm lipoma-like lesion. On the left ventral forearm, there is a lipomatous lesion measuring around 2.5 cm x 1.5 cm.   CNS: Alert and oriented x 3. Grossly non-focal.   PSYCHIATRIC: Normal affect.    LABORATORY WORK:  Reviewed and confirmed in the EMR.    ASSESSMENT AND PLAN:  Low grade, B-cell lymphoma Stage IV. CT scan done on 6/22/17 showed minimal increase in splenomegaly and abdominal adenopathy. CBC sows progressive pancytopenia, however, pt remains asymptomatic. Pt started on Rituxan 375 mg/m2 IV q week x 4 on 06/30/17. Pt had developed a reaction to Rituxan with initial infusion, subsequently started on 20 mg Decadron po to be taken the morning of Rituxan infusion. CT CAP from 8/28/17 showed resolution of splenomegaly and no noticed lymph adenopathy. Patient placed on surveillance with periodic imaging studies. CT from 3/1/2018 showed BANG. Should her lymphoma return, will consider re-starting Rituxan. CT CAP from 9/10/18 showed no evidence of residual or recurrent adenopathy. CT CAP 6/4/19 shows stable pulmonary nodule in LLL. CT CAP on 7/16/2020 showed no evidence of recurrent lymphoma or other significant abnormalities. CT CAP on 7/7/2021 showed no evidence of malignancy. CT CAP on 7/16/2022 showed no sign of malignancy. CT CAP from 8/4/2023 showed no evidence of residual or recurrent malignancy.   Available labs from  today (4/23/2024) were reviewed with the patient during the visit. Creatinine and BUN are elevated at 1.19 and 24 respectively, advised adequate fluid intake. Other counts are stable with no concerns. LDH is normal at 197. Patient is doing well clinically with no evidence of disease. She will continue on observation alone. Will plan to obtain CT CAP prior to next visit to monitor stability. Discussed that the lumps noted on today's physical exam are likely lipomas but advised her to monitor for any changes and to contact me should they occur. I reviewed with the patient various signs and symptoms suggestive of recurrent or metastatic disease and advised her to call me should she develop such symptoms. I plan to see her again in about 4 months for a follow up with labs and CT CAP.     Antiphospholipid antibody syndrome. Pt has persistent elevation of antiphospholipid antibodies and was advised to continue indefinite anticoagulation with Coumadin, managed by [facility]. Following with PCP.   Health Maintenance. Most recent colonoscopy on 9/16/14 was unremarkable. Most recent mmg on 11/21/2023 showed BANG.   COVID vaccination. Most recent booster obtained 10/20/2022. Further discussed possible antiviral therapy with Paxlovid should she develop breakthrough infection.   Seasonal Allergies. Recommend Allegra and Nasacort.   Continue to observe pandemic precautions.   The longitudinal plan of care for lymphoma, antiphospholipid antibody syndrome was addressed during this visit. Due to the added complexity in care, I will continue to support [facility] in the subsequent management of these conditions and with the ongoing continuity of care of these conditions.    The above was discussed with the patient and her  at length. They exhibit complete understanding of the above discussion and are agreeable to proceed with the above plan of care.        Jimmy Esqueda MD    FOLLOW-UP:  Return in 4 months (around 8/23/2024) for  MD visit with cbc, comp, mg, LDH, INR, CT CAP w contrast- f/u [facility]     This chart was documented by Zakia Byrnes, acting as a scribe for Jimmy Esqueda MD. 4/23/2024, 9:36 AM.      The documentation recorded by the scribe accurately and completely reflects the service(s) I personally performed and the decisions made by me.   Jimmy Esqueda MD

## 2024-05-07 ENCOUNTER — PROCEDURE VISIT (OUTPATIENT)
Dept: OBGYN CLINIC | Facility: CLINIC | Age: 59
End: 2024-05-07
Payer: MEDICARE

## 2024-05-07 VITALS
DIASTOLIC BLOOD PRESSURE: 80 MMHG | BODY MASS INDEX: 33.84 KG/M2 | WEIGHT: 191 LBS | HEIGHT: 63 IN | SYSTOLIC BLOOD PRESSURE: 126 MMHG

## 2024-05-07 DIAGNOSIS — N90.4 VULVAR DYSTROPHY: ICD-10-CM

## 2024-05-07 DIAGNOSIS — N90.89 VULVAL LESION: Primary | ICD-10-CM

## 2024-05-07 PROCEDURE — 88305 TISSUE EXAM BY PATHOLOGIST: CPT | Performed by: PATHOLOGY

## 2024-05-07 PROCEDURE — 56605 BIOPSY OF VULVA/PERINEUM: CPT | Performed by: OBSTETRICS & GYNECOLOGY

## 2024-05-07 NOTE — PROGRESS NOTES
"Assessment/Plan:  Vulvar biopsy done, well-tolerated.  Patient will call for results 3.  Reviewed wound care.  Recommend pelvic rest over the next week.  All questions answered.  No problem-specific Assessment & Plan notes found for this encounter.       Diagnoses and all orders for this visit:    Vulval lesion    Vulvar dystrophy    Other orders  -     Probiotic Product (PROBIOTIC PO); Take by mouth          Subjective:      Patient ID: Belkis Cunningham is a 58 y.o. female.    HPI    This is a pleasant 58-year-old female P2 presents for follow-up complaining of chronic vulvar itching irritation extending towards the perianal region.  She went through menopause at age 53.  She has never been on hormone replacement therapy.  She denies any vaginal bleeding or spotting.    She is hydrocortisone cream with some improvement and then with discontinuing her symptoms reoccurred.  We discussed lichen sclerosus etiology treatment, highly suspect.    The following portions of the patient's history were reviewed and updated as appropriate: allergies, current medications, past family history, past medical history, past social history, past surgical history, and problem list.    Review of Systems   Constitutional:  Negative for fatigue, fever and unexpected weight change.   Respiratory:  Negative for cough, chest tightness, shortness of breath and wheezing.    Cardiovascular: Negative.  Negative for chest pain and palpitations.   Gastrointestinal: Negative.  Negative for abdominal distention, abdominal pain, blood in stool, constipation, diarrhea, nausea and vomiting.   Genitourinary: Negative.  Negative for difficulty urinating, dyspareunia, dysuria, flank pain, frequency, genital sores, hematuria, pelvic pain, urgency, vaginal bleeding, vaginal discharge and vaginal pain.   Skin:  Negative for rash.         Objective:      /80   Ht 5' 3\" (1.6 m)   Wt 86.6 kg (191 lb)   BMI 33.83 kg/m²          Physical Exam  "     External genitalia is evident of pale thinning noted along the perineum extending perianal highly suspicious for lichen sclerosis.    Biopsy    Date/Time: 5/7/2024 11:30 AM    Performed by: Kaylee Nicole DO  Authorized by: Kaylee Nicole DO  Universal Protocol:  Consent: Verbal consent obtained.  Risks and benefits: risks, benefits and alternatives were discussed  Consent given by: patient  Patient understanding: patient states understanding of the procedure being performed  Patient identity confirmed: verbally with patient    Procedure Details - Lesion Biopsy:     Body area:  Anogenital    Anogenital location:  Perineal    Biopsy method: punch biopsy      Biopsy tissue type: skin and subcutaneous    Final defect size (mm):  3    Malignancy: malignancy unknown       Along perineum, posterior fourchette, area was cleansed with Betadine solution.  Using 1% lidocaine the area was anesthetized.  Using a key punch biopsy specimen was excised at base.  Hemostasis was maintained using Monsel solution.  Patient tolerated the procedure well.

## 2024-05-13 PROCEDURE — 88305 TISSUE EXAM BY PATHOLOGIST: CPT | Performed by: PATHOLOGY

## 2024-05-14 DIAGNOSIS — L90.0 LICHEN SCLEROSUS: Primary | ICD-10-CM

## 2024-05-14 RX ORDER — CLOBETASOL PROPIONATE 0.5 MG/G
CREAM TOPICAL
Qty: 60 G | Refills: 0 | Status: SHIPPED | OUTPATIENT
Start: 2024-05-14

## 2024-05-15 ENCOUNTER — TELEPHONE (OUTPATIENT)
Age: 59
End: 2024-05-15

## 2024-05-15 NOTE — TELEPHONE ENCOUNTER
Patient called and was return a call from based on cts calling there is no interpretation from provider on results to make pt aware.      Attempted a warm transfer all cts were on a call.      Please call back.

## 2024-08-01 ENCOUNTER — OFFICE VISIT (OUTPATIENT)
Dept: FAMILY MEDICINE CLINIC | Facility: CLINIC | Age: 59
End: 2024-08-01

## 2024-08-01 VITALS
OXYGEN SATURATION: 99 % | HEART RATE: 69 BPM | RESPIRATION RATE: 18 BRPM | BODY MASS INDEX: 33.88 KG/M2 | HEIGHT: 63 IN | WEIGHT: 191.2 LBS | DIASTOLIC BLOOD PRESSURE: 72 MMHG | SYSTOLIC BLOOD PRESSURE: 101 MMHG | TEMPERATURE: 97.9 F

## 2024-08-01 DIAGNOSIS — L90.0 LICHEN SCLEROSUS: Primary | ICD-10-CM

## 2024-08-01 DIAGNOSIS — Z11.9 SCREENING EXAMINATION FOR INFECTIOUS DISEASE: ICD-10-CM

## 2024-08-01 DIAGNOSIS — Z13.1 ENCOUNTER FOR SCREENING FOR DIABETES MELLITUS: ICD-10-CM

## 2024-08-01 DIAGNOSIS — R21 RASH: ICD-10-CM

## 2024-08-01 DIAGNOSIS — Z13.6 SCREENING FOR CARDIOVASCULAR CONDITION: ICD-10-CM

## 2024-08-01 PROCEDURE — G2211 COMPLEX E/M VISIT ADD ON: HCPCS

## 2024-08-01 PROCEDURE — 87510 GARDNER VAG DNA DIR PROBE: CPT

## 2024-08-01 PROCEDURE — 87480 CANDIDA DNA DIR PROBE: CPT

## 2024-08-01 PROCEDURE — 99213 OFFICE O/P EST LOW 20 MIN: CPT

## 2024-08-01 PROCEDURE — 87660 TRICHOMONAS VAGIN DIR PROBE: CPT

## 2024-08-01 NOTE — PROGRESS NOTES
Ambulatory Visit  Name: Belkis Cunningham      : 1965      MRN: [id number]  Encounter Provider: Fazal Cazares MD  Encounter Date: 2024   Encounter department: [facility]    Assessment & Plan   1. Lichen sclerosus  Assessment & Plan:  - no improvement in lesion and itchiness despite intermittent use of hydrocortisone cream  Educated patient on using her hydrocortisone cream daily vs her current intermittent use  Follow up in 2 weeks to see progress and for annual physical  Orders:  -     hydrocortisone 2.5 % cream; Apply topically in the morning  2. Screening for cardiovascular condition  -     Hemoglobin A1C; Future  -     Lipid panel; Future  3. Screening examination for infectious disease  -     HIV 1/2 AG/AB w Reflex [facility] for 2 yr old and above; Future  -     Hepatitis C antibody; Future  4. Encounter for screening for diabetes mellitus  -     Hemoglobin A1C; Future       History of Present Illness     Belkis is a 59 yo F with PMHx of GERD on protonix, who presents to the office for concern of a white lesion on her genital area. She has had occasion itching in the genital area for about a year. She was seen by OBGYN for this complaint on 3/13/24 and then had a vulver biopsy on 24 which showed evidence of lichen sclerosus and negative for squamous intraepithelial lesions and cancer. She was given hydrocortisone cream which she uses very intermittently. She continues to have the same symptoms despite using the cream. She denies changes in her diet or medications. She reports cleaning the area with soap and water frequently. She denies any vaginal bleeding, chest pain, SOB, abdominal pain, or diarrhea.         Review of Systems   Constitutional:  Negative for chills and fever.   HENT:  Negative for ear pain and sore throat.    Eyes:  Negative for pain and visual disturbance.   Respiratory:  Negative for cough and shortness of breath.    Cardiovascular:   "Negative for chest pain and palpitations.   Gastrointestinal:  Negative for abdominal pain and vomiting.   Genitourinary:  Negative for dysuria and hematuria.        White area that intermittently itches; mild relief with hydrocortisone cream   Musculoskeletal:  Negative for arthralgias and back pain.   Skin:  Negative for color change and rash.   Neurological:  Negative for seizures and syncope.   All other systems reviewed and are negative.      Objective     /72 (BP Location: Left arm, Patient Position: Sitting, Cuff Size: Large)   Pulse 69   Temp 97.9 °F (36.6 °C) (Temporal)   Resp 18   Ht 5' 3\" (1.6 m)   Wt 86.7 kg (191 lb 3.2 oz)   SpO2 99%   BMI 33.87 kg/m²     Physical Exam  Vitals reviewed.   Constitutional:       Appearance: She is obese.   Cardiovascular:      Rate and Rhythm: Normal rate and regular rhythm.      Heart sounds: Normal heart sounds. No murmur heard.  Pulmonary:      Effort: Pulmonary effort is normal.      Breath sounds: Normal breath sounds.   Abdominal:      General: Abdomen is flat. Bowel sounds are normal.   Genitourinary:     Exam position: Lithotomy position.      Labia:         Right: Rash present.           Comments: LS on vulva  Musculoskeletal:      Right lower leg: No edema.      Left lower leg: No edema.   Skin:     General: Skin is warm.      Capillary Refill: Capillary refill takes less than 2 seconds.   Neurological:      General: No focal deficit present.      Mental Status: She is alert and oriented to person, place, and time. Mental status is at baseline.   Psychiatric:         Mood and Affect: Mood normal.         Behavior: Behavior normal.       Administrative Statements     "

## 2024-08-01 NOTE — ASSESSMENT & PLAN NOTE
- no improvement in lesion and itchiness despite intermittent use of hydrocortisone cream  Educated patient on using her hydrocortisone cream daily vs her current intermittent use  Follow up in 2 weeks to see progress and for annual physical

## 2024-08-02 ENCOUNTER — APPOINTMENT (OUTPATIENT)
Dept: LAB | Age: 59
End: 2024-08-02
Payer: MEDICARE

## 2024-08-02 DIAGNOSIS — Z13.6 SCREENING FOR CARDIOVASCULAR CONDITION: ICD-10-CM

## 2024-08-02 DIAGNOSIS — Z13.1 ENCOUNTER FOR SCREENING FOR DIABETES MELLITUS: ICD-10-CM

## 2024-08-02 DIAGNOSIS — Z11.9 SCREENING EXAMINATION FOR INFECTIOUS DISEASE: ICD-10-CM

## 2024-08-02 LAB
CANDIDA RRNA VAG QL PROBE: NOT DETECTED
CHOLEST SERPL-MCNC: 193 MG/DL
EST. AVERAGE GLUCOSE BLD GHB EST-MCNC: 120 MG/DL
G VAGINALIS RRNA GENITAL QL PROBE: NOT DETECTED
HBA1C MFR BLD: 5.8 %
HCV AB SER QL: NORMAL
HDLC SERPL-MCNC: 55 MG/DL
HIV 1+2 AB+HIV1 P24 AG SERPL QL IA: NORMAL
HIV 2 AB SERPL QL IA: NORMAL
HIV1 AB SERPL QL IA: NORMAL
HIV1 P24 AG SERPL QL IA: NORMAL
LDLC SERPL CALC-MCNC: 111 MG/DL (ref 0–100)
NONHDLC SERPL-MCNC: 138 MG/DL
T VAGINALIS RRNA GENITAL QL PROBE: NOT DETECTED
TRIGL SERPL-MCNC: 136 MG/DL

## 2024-08-02 PROCEDURE — 87389 HIV-1 AG W/HIV-1&-2 AB AG IA: CPT

## 2024-08-02 PROCEDURE — 83036 HEMOGLOBIN GLYCOSYLATED A1C: CPT

## 2024-08-02 PROCEDURE — 86803 HEPATITIS C AB TEST: CPT

## 2024-08-02 PROCEDURE — 80061 LIPID PANEL: CPT

## 2024-08-02 PROCEDURE — 36415 COLL VENOUS BLD VENIPUNCTURE: CPT

## 2024-08-22 ENCOUNTER — RA CDI HCC (OUTPATIENT)
Dept: OTHER | Facility: HOSPITAL | Age: 59
End: 2024-08-22

## 2024-08-31 PROBLEM — Z11.9 SCREENING EXAMINATION FOR INFECTIOUS DISEASE: Status: RESOLVED | Noted: 2024-08-01 | Resolved: 2024-08-31

## 2024-08-31 PROBLEM — Z13.1 ENCOUNTER FOR SCREENING FOR DIABETES MELLITUS: Status: RESOLVED | Noted: 2024-08-01 | Resolved: 2024-08-31

## 2024-08-31 PROBLEM — Z13.6 SCREENING FOR CARDIOVASCULAR CONDITION: Status: RESOLVED | Noted: 2024-08-01 | Resolved: 2024-08-31

## 2025-01-31 ENCOUNTER — APPOINTMENT (OUTPATIENT)
Dept: LAB | Age: 60
End: 2025-01-31
Payer: MEDICARE

## 2025-01-31 DIAGNOSIS — R94.6 NONSPECIFIC ABNORMAL RESULTS OF THYROID FUNCTION STUDY: ICD-10-CM

## 2025-01-31 DIAGNOSIS — E83.30 DISORDER OF PHOSPHORUS METABOLISM: ICD-10-CM

## 2025-01-31 DIAGNOSIS — K21.00 GASTROESOPHAGEAL REFLUX DISEASE WITH ESOPHAGITIS WITHOUT HEMORRHAGE: ICD-10-CM

## 2025-01-31 DIAGNOSIS — E79.0 URICACIDEMIA: ICD-10-CM

## 2025-01-31 DIAGNOSIS — R94.7 NONSPECIFIC ABNORMAL RESULTS OF ENDOCRINE FUNCTION STUDY: ICD-10-CM

## 2025-01-31 DIAGNOSIS — E22.1 IDIOPATHIC HYPERPROLACTINEMIA (HCC): ICD-10-CM

## 2025-01-31 DIAGNOSIS — I51.9 MYXEDEMA HEART DISEASE: ICD-10-CM

## 2025-01-31 DIAGNOSIS — E03.9 MYXEDEMA HEART DISEASE: ICD-10-CM

## 2025-01-31 DIAGNOSIS — B27.00 GAMMAHERPESVIRAL MONONUCLEOSIS WITHOUT COMPLICATION: ICD-10-CM

## 2025-01-31 DIAGNOSIS — R79.9 ABNORMAL BLOOD CHEMISTRY: ICD-10-CM

## 2025-01-31 DIAGNOSIS — E61.2 MAGNESIUM DEFICIENCY: ICD-10-CM

## 2025-01-31 LAB
25(OH)D3 SERPL-MCNC: 23.4 NG/ML (ref 30–100)
ANION GAP SERPL CALCULATED.3IONS-SCNC: 7 MMOL/L (ref 4–13)
BACTERIA UR QL AUTO: ABNORMAL /HPF
BILIRUB UR QL STRIP: NEGATIVE
BNP SERPL-MCNC: 46 PG/ML (ref 0–100)
BUN SERPL-MCNC: 16 MG/DL (ref 5–25)
CALCIUM SERPL-MCNC: 8.8 MG/DL (ref 8.4–10.2)
CHLORIDE SERPL-SCNC: 106 MMOL/L (ref 96–108)
CHOLEST SERPL-MCNC: 180 MG/DL (ref ?–200)
CLARITY UR: CLEAR
CO2 SERPL-SCNC: 28 MMOL/L (ref 21–32)
COLOR UR: ABNORMAL
CORTIS AM PEAK SERPL-MCNC: 8.8 UG/DL (ref 6.7–22.6)
CREAT SERPL-MCNC: 0.77 MG/DL (ref 0.6–1.3)
ERYTHROCYTE [DISTWIDTH] IN BLOOD BY AUTOMATED COUNT: 12.7 % (ref 11.6–15.1)
ESTRADIOL SERPL-MCNC: 17.4 PG/ML
FERRITIN SERPL-MCNC: 52 NG/ML (ref 11–307)
GFR SERPL CREATININE-BSD FRML MDRD: 84 ML/MIN/1.73SQ M
GLUCOSE P FAST SERPL-MCNC: 91 MG/DL (ref 65–99)
GLUCOSE UR STRIP-MCNC: NEGATIVE MG/DL
HCT VFR BLD AUTO: 40 % (ref 34.8–46.1)
HDLC SERPL-MCNC: 54 MG/DL
HGB BLD-MCNC: 12.6 G/DL (ref 11.5–15.4)
HGB UR QL STRIP.AUTO: ABNORMAL
INSULIN SERPL-ACNC: 9.03 UIU/ML (ref 1.9–23)
IRON SATN MFR SERPL: 11 % (ref 15–50)
IRON SERPL-MCNC: 40 UG/DL (ref 50–212)
KETONES UR STRIP-MCNC: NEGATIVE MG/DL
LDLC SERPL CALC-MCNC: 104 MG/DL (ref 0–100)
LEUKOCYTE ESTERASE UR QL STRIP: NEGATIVE
MAGNESIUM SERPL-MCNC: 2.1 MG/DL (ref 1.9–2.7)
MCH RBC QN AUTO: 28.7 PG (ref 26.8–34.3)
MCHC RBC AUTO-ENTMCNC: 31.5 G/DL (ref 31.4–37.4)
MCV RBC AUTO: 91 FL (ref 82–98)
MUCOUS THREADS UR QL AUTO: ABNORMAL
NITRITE UR QL STRIP: NEGATIVE
NON-SQ EPI CELLS URNS QL MICRO: ABNORMAL /HPF
NONHDLC SERPL-MCNC: 126 MG/DL
PH UR STRIP.AUTO: 5 [PH]
PHOSPHATE SERPL-MCNC: 3.2 MG/DL (ref 2.7–4.5)
PLATELET # BLD AUTO: 273 THOUSANDS/UL (ref 149–390)
PMV BLD AUTO: 12.3 FL (ref 8.9–12.7)
POTASSIUM SERPL-SCNC: 4 MMOL/L (ref 3.5–5.3)
PROGEST SERPL-MCNC: 0.49 NG/ML
PROLACTIN SERPL-MCNC: 5.74 NG/ML (ref 2.74–19.64)
PROT UR STRIP-MCNC: NEGATIVE MG/DL
RBC # BLD AUTO: 4.39 MILLION/UL (ref 3.81–5.12)
RBC #/AREA URNS AUTO: ABNORMAL /HPF
SODIUM SERPL-SCNC: 141 MMOL/L (ref 135–147)
SP GR UR STRIP.AUTO: 1.02 (ref 1–1.03)
TESTOST SERPL-MSCNC: <10 NG/DL (ref 0–75)
TIBC SERPL-MCNC: 368.2 UG/DL (ref 250–450)
TRANSFERRIN SERPL-MCNC: 263 MG/DL (ref 203–362)
TRIGL SERPL-MCNC: 108 MG/DL (ref ?–150)
TSH SERPL DL<=0.05 MIU/L-ACNC: 1.29 UIU/ML (ref 0.45–4.5)
UIBC SERPL-MCNC: 328 UG/DL (ref 155–355)
URATE SERPL-MCNC: 4.9 MG/DL (ref 2–7.5)
UROBILINOGEN UR STRIP-ACNC: <2 MG/DL
WBC # BLD AUTO: 5.61 THOUSAND/UL (ref 4.31–10.16)
WBC #/AREA URNS AUTO: ABNORMAL /HPF

## 2025-01-31 PROCEDURE — 86430 RHEUMATOID FACTOR TEST QUAL: CPT

## 2025-01-31 PROCEDURE — 83550 IRON BINDING TEST: CPT

## 2025-01-31 PROCEDURE — 84403 ASSAY OF TOTAL TESTOSTERONE: CPT

## 2025-01-31 PROCEDURE — 85027 COMPLETE CBC AUTOMATED: CPT

## 2025-01-31 PROCEDURE — 86308 HETEROPHILE ANTIBODY SCREEN: CPT

## 2025-01-31 PROCEDURE — 82670 ASSAY OF TOTAL ESTRADIOL: CPT

## 2025-01-31 PROCEDURE — 86376 MICROSOMAL ANTIBODY EACH: CPT

## 2025-01-31 PROCEDURE — 86225 DNA ANTIBODY NATIVE: CPT

## 2025-01-31 PROCEDURE — 84100 ASSAY OF PHOSPHORUS: CPT

## 2025-01-31 PROCEDURE — 84144 ASSAY OF PROGESTERONE: CPT

## 2025-01-31 PROCEDURE — 84443 ASSAY THYROID STIM HORMONE: CPT

## 2025-01-31 PROCEDURE — 83735 ASSAY OF MAGNESIUM: CPT

## 2025-01-31 PROCEDURE — 82626 DEHYDROEPIANDROSTERONE: CPT

## 2025-01-31 PROCEDURE — 80061 LIPID PANEL: CPT

## 2025-01-31 PROCEDURE — 81001 URINALYSIS AUTO W/SCOPE: CPT

## 2025-01-31 PROCEDURE — 84146 ASSAY OF PROLACTIN: CPT

## 2025-01-31 PROCEDURE — 36415 COLL VENOUS BLD VENIPUNCTURE: CPT

## 2025-01-31 PROCEDURE — 83540 ASSAY OF IRON: CPT

## 2025-01-31 PROCEDURE — 83525 ASSAY OF INSULIN: CPT

## 2025-01-31 PROCEDURE — 86038 ANTINUCLEAR ANTIBODIES: CPT

## 2025-01-31 PROCEDURE — 82728 ASSAY OF FERRITIN: CPT

## 2025-01-31 PROCEDURE — 82533 TOTAL CORTISOL: CPT

## 2025-01-31 PROCEDURE — 83880 ASSAY OF NATRIURETIC PEPTIDE: CPT

## 2025-01-31 PROCEDURE — 86800 THYROGLOBULIN ANTIBODY: CPT

## 2025-01-31 PROCEDURE — 80048 BASIC METABOLIC PNL TOTAL CA: CPT

## 2025-01-31 PROCEDURE — 82306 VITAMIN D 25 HYDROXY: CPT

## 2025-01-31 PROCEDURE — 84550 ASSAY OF BLOOD/URIC ACID: CPT

## 2025-02-01 ENCOUNTER — APPOINTMENT (OUTPATIENT)
Dept: LAB | Age: 60
End: 2025-02-01
Payer: MEDICARE

## 2025-02-01 DIAGNOSIS — B27.00 GAMMAHERPESVIRAL MONONUCLEOSIS WITHOUT COMPLICATION: ICD-10-CM

## 2025-02-01 DIAGNOSIS — R79.9 ABNORMAL BLOOD CHEMISTRY: ICD-10-CM

## 2025-02-01 DIAGNOSIS — R94.7 NONSPECIFIC ABNORMAL RESULTS OF ENDOCRINE FUNCTION STUDY: ICD-10-CM

## 2025-02-01 DIAGNOSIS — E22.1 IDIOPATHIC HYPERPROLACTINEMIA (HCC): ICD-10-CM

## 2025-02-01 DIAGNOSIS — E61.2 MAGNESIUM DEFICIENCY: ICD-10-CM

## 2025-02-01 DIAGNOSIS — K21.00 GASTROESOPHAGEAL REFLUX DISEASE WITH ESOPHAGITIS WITHOUT HEMORRHAGE: ICD-10-CM

## 2025-02-01 DIAGNOSIS — E83.30 DISORDER OF PHOSPHORUS METABOLISM: ICD-10-CM

## 2025-02-01 DIAGNOSIS — E79.0 URICACIDEMIA: ICD-10-CM

## 2025-02-01 DIAGNOSIS — E03.9 MYXEDEMA HEART DISEASE: ICD-10-CM

## 2025-02-01 DIAGNOSIS — I51.9 MYXEDEMA HEART DISEASE: ICD-10-CM

## 2025-02-01 DIAGNOSIS — R94.6 NONSPECIFIC ABNORMAL RESULTS OF THYROID FUNCTION STUDY: ICD-10-CM

## 2025-02-01 LAB
HETEROPH AB SER QL: NEGATIVE
THYROGLOB AB SERPL-ACNC: <1 IU/ML (ref 0–0.9)
THYROPEROXIDASE AB SERPL-ACNC: <9 IU/ML (ref 0–34)

## 2025-02-01 PROCEDURE — 82542 COL CHROMOTOGRAPHY QUAL/QUAN: CPT

## 2025-02-02 LAB
DSDNA IGG SERPL IA-ACNC: 1.1 IU/ML (ref ?–15)
NUCLEAR IGG SER IA-RTO: 0.5 RATIO (ref ?–1)
RHEUMATOID FACT SER QL LA: NEGATIVE

## 2025-02-04 LAB
DHEA SERPL-MCNC: 98 NG/DL (ref 21–402)
UBIQUINONE10 SERPL-MCNC: 0.54 UG/ML (ref 0.37–2.2)

## (undated) DEVICE — BETHLEHEM UNIVERSAL MINOR VAG: Brand: CARDINAL HEALTH

## (undated) DEVICE — GLOVE PI ULTRA TOUCH SZ.6.5

## (undated) DEVICE — SYRINGE CATH TIP 50ML

## (undated) DEVICE — GLOVE INDICATOR PI UNDERGLOVE SZ 6.5 BLUE